# Patient Record
Sex: FEMALE | Race: WHITE | Employment: FULL TIME | URBAN - METROPOLITAN AREA
[De-identification: names, ages, dates, MRNs, and addresses within clinical notes are randomized per-mention and may not be internally consistent; named-entity substitution may affect disease eponyms.]

---

## 2017-01-01 ENCOUNTER — HOSPITAL ENCOUNTER (EMERGENCY)
Facility: CLINIC | Age: 22
Discharge: PSYCHIATRIC OR SUBSTANCE USE FACILITY | End: 2017-01-01
Attending: EMERGENCY MEDICINE | Admitting: EMERGENCY MEDICINE
Payer: COMMERCIAL

## 2017-01-01 ENCOUNTER — HOSPITAL ENCOUNTER (INPATIENT)
Facility: CLINIC | Age: 22
LOS: 2 days | Discharge: HOME | DRG: 885 | End: 2017-01-03
Attending: PSYCHIATRY & NEUROLOGY | Admitting: PSYCHIATRY & NEUROLOGY
Payer: COMMERCIAL

## 2017-01-01 VITALS
OXYGEN SATURATION: 96 % | DIASTOLIC BLOOD PRESSURE: 60 MMHG | SYSTOLIC BLOOD PRESSURE: 112 MMHG | TEMPERATURE: 98.8 F | RESPIRATION RATE: 20 BRPM | HEART RATE: 76 BPM

## 2017-01-01 DIAGNOSIS — F33.9 RECURRENT MAJOR DEPRESSIVE DISORDER, REMISSION STATUS UNSPECIFIED (H): ICD-10-CM

## 2017-01-01 DIAGNOSIS — F32.1 MODERATE SINGLE CURRENT EPISODE OF MAJOR DEPRESSIVE DISORDER (H): Primary | ICD-10-CM

## 2017-01-01 DIAGNOSIS — F33.2 MAJOR DEPRESSIVE DISORDER, RECURRENT SEVERE WITHOUT PSYCHOTIC FEATURES (H): ICD-10-CM

## 2017-01-01 DIAGNOSIS — R45.851 SUICIDAL IDEATION: ICD-10-CM

## 2017-01-01 LAB
ANION GAP SERPL CALCULATED.3IONS-SCNC: 7 MMOL/L (ref 3–14)
BASOPHILS # BLD AUTO: 0.1 10E9/L (ref 0–0.2)
BASOPHILS NFR BLD AUTO: 0.7 %
BUN SERPL-MCNC: 13 MG/DL (ref 7–30)
CALCIUM SERPL-MCNC: 9 MG/DL (ref 8.5–10.1)
CHLORIDE SERPL-SCNC: 109 MMOL/L (ref 94–109)
CO2 SERPL-SCNC: 25 MMOL/L (ref 20–32)
CREAT SERPL-MCNC: 0.75 MG/DL (ref 0.52–1.04)
DIFFERENTIAL METHOD BLD: NORMAL
EOSINOPHIL # BLD AUTO: 0.3 10E9/L (ref 0–0.7)
EOSINOPHIL NFR BLD AUTO: 2.9 %
ERYTHROCYTE [DISTWIDTH] IN BLOOD BY AUTOMATED COUNT: 12.2 % (ref 10–15)
ETHANOL SERPL-MCNC: <0.01 G/DL
GFR SERPL CREATININE-BSD FRML MDRD: NORMAL ML/MIN/1.7M2
GLUCOSE SERPL-MCNC: 92 MG/DL (ref 70–99)
HCT VFR BLD AUTO: 39.2 % (ref 35–47)
HGB BLD-MCNC: 12.9 G/DL (ref 11.7–15.7)
IMM GRANULOCYTES # BLD: 0 10E9/L (ref 0–0.4)
IMM GRANULOCYTES NFR BLD: 0.3 %
LYMPHOCYTES # BLD AUTO: 2.8 10E9/L (ref 0.8–5.3)
LYMPHOCYTES NFR BLD AUTO: 31.6 %
MCH RBC QN AUTO: 33 PG (ref 26.5–33)
MCHC RBC AUTO-ENTMCNC: 32.9 G/DL (ref 31.5–36.5)
MCV RBC AUTO: 100 FL (ref 78–100)
MONOCYTES # BLD AUTO: 0.5 10E9/L (ref 0–1.3)
MONOCYTES NFR BLD AUTO: 5.8 %
NEUTROPHILS # BLD AUTO: 5.3 10E9/L (ref 1.6–8.3)
NEUTROPHILS NFR BLD AUTO: 58.7 %
NRBC # BLD AUTO: 0 10*3/UL
NRBC BLD AUTO-RTO: 0 /100
PLATELET # BLD AUTO: 259 10E9/L (ref 150–450)
POTASSIUM SERPL-SCNC: 3.5 MMOL/L (ref 3.4–5.3)
RBC # BLD AUTO: 3.91 10E12/L (ref 3.8–5.2)
SODIUM SERPL-SCNC: 141 MMOL/L (ref 133–144)
WBC # BLD AUTO: 9 10E9/L (ref 4–11)

## 2017-01-01 PROCEDURE — 99222 1ST HOSP IP/OBS MODERATE 55: CPT | Mod: AI | Performed by: PSYCHIATRY & NEUROLOGY

## 2017-01-01 PROCEDURE — 12400001 ZZH R&B MH UMMC

## 2017-01-01 PROCEDURE — 99285 EMERGENCY DEPT VISIT HI MDM: CPT | Mod: 25

## 2017-01-01 PROCEDURE — 85025 COMPLETE CBC W/AUTO DIFF WBC: CPT | Performed by: EMERGENCY MEDICINE

## 2017-01-01 PROCEDURE — 80320 DRUG SCREEN QUANTALCOHOLS: CPT | Performed by: EMERGENCY MEDICINE

## 2017-01-01 PROCEDURE — 99285 EMERGENCY DEPT VISIT HI MDM: CPT

## 2017-01-01 PROCEDURE — 36415 COLL VENOUS BLD VENIPUNCTURE: CPT | Performed by: EMERGENCY MEDICINE

## 2017-01-01 PROCEDURE — 90791 PSYCH DIAGNOSTIC EVALUATION: CPT

## 2017-01-01 PROCEDURE — 80048 BASIC METABOLIC PNL TOTAL CA: CPT | Performed by: EMERGENCY MEDICINE

## 2017-01-01 RX ORDER — ACETAMINOPHEN 325 MG/1
650 TABLET ORAL EVERY 4 HOURS PRN
Status: DISCONTINUED | OUTPATIENT
Start: 2017-01-01 | End: 2017-01-03 | Stop reason: HOSPADM

## 2017-01-01 RX ORDER — ALUMINA, MAGNESIA, AND SIMETHICONE 2400; 2400; 240 MG/30ML; MG/30ML; MG/30ML
30 SUSPENSION ORAL EVERY 4 HOURS PRN
Status: DISCONTINUED | OUTPATIENT
Start: 2017-01-01 | End: 2017-01-03 | Stop reason: HOSPADM

## 2017-01-01 RX ORDER — TRAZODONE HYDROCHLORIDE 50 MG/1
50 TABLET, FILM COATED ORAL
Status: DISCONTINUED | OUTPATIENT
Start: 2017-01-01 | End: 2017-01-03 | Stop reason: HOSPADM

## 2017-01-01 RX ORDER — HYDROXYZINE HYDROCHLORIDE 25 MG/1
25-50 TABLET, FILM COATED ORAL EVERY 4 HOURS PRN
Status: DISCONTINUED | OUTPATIENT
Start: 2017-01-01 | End: 2017-01-03 | Stop reason: HOSPADM

## 2017-01-01 RX ORDER — BISACODYL 10 MG
10 SUPPOSITORY, RECTAL RECTAL DAILY PRN
Status: DISCONTINUED | OUTPATIENT
Start: 2017-01-01 | End: 2017-01-03 | Stop reason: HOSPADM

## 2017-01-01 ASSESSMENT — ACTIVITIES OF DAILY LIVING (ADL)
DRESS: SCRUBS (BEHAVIORAL HEALTH)
ORAL_HYGIENE: INDEPENDENT
GROOMING: HANDWASHING;INDEPENDENT
ORAL_HYGIENE: PROMPTS
DRESS: STREET CLOTHES;INDEPENDENT
GROOMING: PROMPTS

## 2017-01-01 ASSESSMENT — ENCOUNTER SYMPTOMS: DYSPHORIC MOOD: 1
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
## 2017-01-01 NOTE — PLAN OF CARE
Problem: General Plan of Care (Inpatient Behavioral)  Goal: Individualization/Patient Specific Goal (IP Behavioral)  The patient and/or their representative will achieve their patient-specific goals related to the plan of care.    The patient-specific goals include:   Illness Management Recovery model: Objectives    Patient will identify reason(s) for hospitalization from their perspective.  Patient will identify a minimum of three goals for discharge.  Patient will identify a minimum of three triggers that may increase their symptoms.  Patient will identify a minimum of three coping skills they can do to stay well.  Patient will identify their support system to demonstrate readiness for discharge.

## 2017-01-01 NOTE — ED NOTES
"Patient arrives via EMS on an MADELIN by a [facility] after she made suicidal threats to her parents when they would not let her take the car this evening to go to a friend's house. She was recently evaluated in this ED for a suicide attempt (cutting and ingestion) but was sent home. She is alert and oriented, cooperative at this time, states \"I don't know\" when asked if she is having thoughts of harming herself. Changed into scrubs and belongings secured upon arrival.  "

## 2017-01-01 NOTE — ED PROVIDER NOTES
Received change over from Dr. Veras for this patient.  Patient remained suicidal while in the ED and cannot contract for safety.  She was evaluated by DEC and we are in agreement that patient is at high risk for suicide at the time of evaluation. Psych requested 72 hour hold which was initiated and was accepted at [facility].  Patient has remained calm and cooperative under my care.    Harsh Estrada MD  01/01/17 1029

## 2017-01-01 NOTE — ED PROVIDER NOTES
History     Chief Complaint:  Suicidal      The history is provided by the patient.      Minoo William is a 21 year old female who presents with suicidal ideation. The patient states that she has had increasing depression for the last few months. Three days ago, she cut her wrists for the first time. She was seen here in the ED earlier in the day on 12/31/2015 for suicidal ideation and was set up to see a therapist on Monday. Tonight, her depression worsened when her parents would not let her take the car to go to a friend's house. She states that she thus had suicidal ideation, but denies any specific plan. The patient has not been drinking any alcohol this evening.         Allergies:  No known drug allergies.       Medications:    The patient is currently on no regular medications.       Past Medical History:    History reviewed.  No significant past medical history.       Past Surgical History:    History reviewed. No pertinent past surgical history.      Family History:    History reviewed. No pertinent family history.      Social History:  Marital Status: Single  Presents to the ED via EMS alone  Tobacco Use: Current Every Day Smoker  Alcohol Use: No  PCP: [facility]      Review of Systems   Psychiatric/Behavioral: Positive for suicidal ideas and dysphoric mood.   All other systems reviewed and are negative.      Physical Exam   First Vitals:  BP: 117/71 mmHg  Pulse: 76  Temp: 98.8  F (37.1  C)  Resp: 20  SpO2: 100 %    Physical Exam  Constitutional: Appears well-developed and well-nourished. Patient is tearful.  HENT:    Head: No external signs of trauma noted.    Eyes: Conjunctivae are normal. Pupils are equal, round, and reactive to light.    Cardiovascular: Normal rate, regular rhythm and normal heart sounds.    Pulmonary/Chest: Effort normal and breath sounds normal. No respiratory distress. Patient has no wheezes.   Neurological: Patient is alert and oriented to person, place, and time.    Skin: Skin is warm and dry. No diaphoresis noted. Superficial lacerations noted to the left wrist. These have scabs over them and the patient stated that those were from a couple days ago.  Psychiatric: Patient has a depressed mood and flat affect. Somewhat poor eye contact. Evasive with some answers to questions.  Emergency Department Course   Laboratory:  Alcohol ethyl; <0.01  BMP:  WNL (Creatinine 0.75)   CBC:  WBC 9.0, HGB 12.9, , otherwise WNL     Emergency Department Course:  Nursing notes and vitals reviewed.  I performed an exam of the patient as documented above.   A peripheral IV was established. Blood was drawn from the patient. This was sent for laboratory testing, findings above.    Patient's care will be signed out to Dr. Estrada pending DEC assessment and final disposition.     Impression & Plan      Medical Decision Making:  Minoo William is a 21 year old female who presented to the ER for suicidal ideations. She was actually seen yesterday and evaluated and discharged. She states that there was some disagreement with her parents over the use of a car and she began to feel more suicidal. She has been cooperative in the ER, but definitely has a depressed mood and flat affect. She does not really have that great of eye contact and is not terribly forthcoming with answers to questions. I have placed her on an MADELIN and I will await DEC assessment. The patient will be signed out to the oncoming ED provider for final disposition after DEC assessment.     Diagnosis:    ICD-10-CM    1. Recurrent major depressive disorder, remission status unspecified (H) F33.9        I, Tereza Cloud, am serving as a scribe on 1/1/2017 at 2:19 AM to personally document services performed by Dr. Veras based on my observations and the provider's statements to me.    1/1/2017   [facility] EMERGENCY DEPARTMENT        Felix Veras DO  01/01/17 0739

## 2017-01-01 NOTE — IP AVS SNAPSHOT
[facility]    [facility] [address]    [address] [address]    Phone:  [phone]                                       After Visit Summary   1/1/2017    Minoo William    MRN: [id number]           After Visit Summary Signature Page     I have received my discharge instructions, and my questions have been answered. I have discussed any challenges I see with this plan with the nurse or doctor.    ..........................................................................................................................................  Patient/Patient Representative Signature      ..........................................................................................................................................  Patient Representative Print Name and Relationship to Patient    ..................................................               ................................................  Date                                            Time    ..........................................................................................................................................  Reviewed by Signature/Title    ...................................................              ..............................................  Date                                                            Time

## 2017-01-01 NOTE — PLAN OF CARE
Problem: General Plan of Care (Inpatient Behavioral)  Goal: Individualization/Patient Specific Goal (IP Behavioral)  The patient and/or their representative will achieve their patient-specific goals related to the plan of care.    The patient-specific goals include:   Illness Management Recovery model:  Self-Reflection & Planning.    Assessed patient's progress completing forms related to Illness Management Recovery (including Personal Plan of Care, Adult Coping Plan, and My Support and Coping Plan) and assisted as needed.    Encouraged patient to continue to consider triggers, wellness strategies, early warning signs, feedback from others, actions to take to prevent relapse, and coping strategies as part of a plan to remain well after leaving the hospital.    1. Talking to a friend  2. Doing my make up  3. Work

## 2017-01-01 NOTE — PROGRESS NOTES
01/01/17 1218   Patient Belongings   Did you bring any home meds/supplements to the hospital?  No   Patient Belongings cell phone/electronics;clothing;keys   Disposition of Belongings shoes, bra, yoga pants, pink shirt, keys, samsung cell phone, turtleneck sweater in pt bin in locked utility room   Belongings Search Yes   Clothing Search Yes   Second Staff Eboni PACHECO     In Pt Bin:    Shoes  Bra  Yoga pants  Shirt  Keys  Cell phone samsung  Turtleneck sweater    ADMISSION:  I am responsible for any personal items that are not sent to the safe or pharmacy. [facility] is not responsible for loss, theft or damage of any property in my possession.    Patient Signature _____________________ Date/Time _____________________    Staff Signature _______________________ Date/Time _____________________    2nd Staff person, if patient is unable/unwilling to sign  ___________________________________ Date/Time _____________________    DISCHARGE:  My personal items have been returned to me.   Patient Signature _____________________ Date/Time _____________________

## 2017-01-01 NOTE — IP AVS SNAPSHOT
MRN:[id number]                      After Visit Summary   1/1/2017    Minoo William    MRN: [id number]           Thank you!     Thank you for choosing [facility] for your care. Our goal is always to provide you with excellent care. Hearing back from our patients is one way we can continue to improve our services. Please take a few minutes to complete the written survey that you may receive in the mail after you visit with us. Thank you!        Patient Information     Date Of Birth          1995        About your hospital stay     You were admitted on:  January 1, 2017 You last received care in the:  [facility]    You were discharged on:  January 3, 2017       Who to Call     For medical emergencies, please call 911.  For non-urgent questions about your medical care, please call your primary care provider or clinic, [phone]          Attending Provider     Provider    Joey Terrazas MD       Primary Care Provider Office Phone # Fax #    [facility] [phone]       [address] [place], [place] [address]        Further instructions from your care team       Behavioral Discharge Planning and Instructions    Summary: You were admitted to the hospital on 1/1/17 and treated by Dr. Joey Terrazas. You are being discharged to home on 01/03/2017     Main Diagnosis: Major depressive disorder, single episode, moderate    Major Treatments, Procedures and Findings: Psychiatric evaluation; unit programming; aftercare planning.    Symptoms to Report: feeling more aggressive, increased confusion, losing more sleep, mood getting worse or thoughts of suicide    Lifestyle Adjustment: Take medications as prescribed. Go to follow up appointments as scheduled. Get adequate rest and try to eat healthy foods.    Psychiatry Follow-up:   [facility]  Tuesday January 10 @ 9:40am with Audrey Schrader  [address] [place], [place] [address] [phone]  [phone]  [facility] has faxed the discharge summary and AVS to this provider at [phone]     Therapist Yolanda Dalal PhD  Thursday January 5 @ 1:00pm  [address]   [place] [address]  [phone]  [facility] has faxed the discharge summary and AVS to this provider at [phone]     Mental health crisis response for your county is offered 24 hours a day, 7 days a week. A trained counselor will assess your current situation, offer support and counseling and connect you with local resources. Please call  [facility] Crisis Team:  [phone]    Resources:   Crisis Intervention: 343.436.7211 or 366-691-5356 (TTY: 936.785.6404).  Call anytime for help.  National [place] on Mental Illness (www.[place].bertha.org): 942.894.3610 or 860-698-8619.  Suicide Awareness Voices of Education (SAVE) (www.save.org): 541-579-VDPH (4287)  National Suicide Prevention Line (www.mentalhealthmn.org): 283-199-LAJL (2096)  Mental Health Consumer/Survivor Network of [place] (www.mhcsn.net): 537.175.4194 or 166-162-4909  Mental Health Association of [place] (www.mentalhealth.org): 591.500.6668 or 925-289-7270    General Medication Instructions:   See your medication sheet(s) for instructions.   Take all medicines as directed.  Make no changes unless your doctor suggests them.   Go to all your doctor visits.  Be sure to have all your required lab tests. This way, your medicines can be refilled on time.  Do not use any drugs not prescribed by your doctor.  Avoid alcohol.      The treatment team has appreciated the opportunity to work with you.  We wish you the best in the future.  You will be receiving a follow-up phone call within the next three days from a representative from behavioral health.  You have identified the best phone number to reach you as [phone].   If you have any questions or concerns our unit number is [phone].       Pending Results     No orders found from 12/31/2016 to 1/2/2017.           "  Statement of Approval     Ordered          17 1241  I have reviewed and agree with all the recommendations and orders detailed in this document.   EFFECTIVE NOW     Approved and electronically signed by:  Joey Terrazas MD             Admission Information        Provider Department Dept Phone    2017 Joey Terrazas MD [facility]  [phone]      Your Vitals Were     Blood Pressure Pulse Temperature    129/61 mmHg 77 98.1  F (36.7  C) (Oral)    Respirations Height Weight    16 1.702 m (5' 7\") 97.796 kg (215 lb 9.6 oz)    BMI (Body Mass Index) Pulse Oximetry       33.76 kg/m2 97%       MyChart Information     [facility] lets you send messages to your doctor, view your test results, renew your prescriptions, schedule appointments and more. To sign up, go to [email or web address] . Click on \"Log in\" on the left side of the screen, which will take you to the Welcome page. Then click on \"Sign up Now\" on the right side of the page.     You will be asked to enter the access code listed below, as well as some personal information. Please follow the directions to create your username and password.     Your access code is: [email or web address]  Expires: 3/31/2017  4:13 PM     Your access code will  in 90 days. If you need help or a new code, please call your [facility] clinic or [phone].        Care EveryWhere ID     This is your Care EveryWhere ID. This could be used by other organizations to access your [facility] medical records  [id number]           Review of your medicines      START taking        Dose / Directions    sertraline 50 MG tablet   Commonly known as:  ZOLOFT   Used for:  Major depressive disorder, recurrent severe without psychotic features (H)        Take one-half tab daily for six days then increase to 1 tab daily.   Quantity:  30 tablet   Refills:  1            Where to get your medicines      These medications were sent to [facility] [place], [place] - " [address]  [address] [facility] [address]     Phone:  [phone]    - sertraline 50 MG tablet             Protect others around you: Learn how to safely use, store and throw away your medicines at www.disposemymeds.org.             Medication List: This is a list of all your medications and when to take them. Check marks below indicate your daily home schedule. Keep this list as a reference.      Medications           Morning Afternoon Evening Bedtime As Needed    sertraline 50 MG tablet   Commonly known as:  ZOLOFT   Take one-half tab daily for six days then increase to 1 tab daily.   Last time this was given:  25 mg on 1/3/2017  9:00 AM

## 2017-01-02 LAB — TSH SERPL DL<=0.005 MIU/L-ACNC: 1.95 MU/L (ref 0.4–4)

## 2017-01-02 PROCEDURE — 84443 ASSAY THYROID STIM HORMONE: CPT | Performed by: PSYCHIATRY & NEUROLOGY

## 2017-01-02 PROCEDURE — 36415 COLL VENOUS BLD VENIPUNCTURE: CPT | Performed by: PSYCHIATRY & NEUROLOGY

## 2017-01-02 PROCEDURE — 12400001 ZZH R&B MH UMMC

## 2017-01-02 PROCEDURE — 25000132 ZZH RX MED GY IP 250 OP 250 PS 637: Performed by: PSYCHIATRY & NEUROLOGY

## 2017-01-02 PROCEDURE — 99232 SBSQ HOSP IP/OBS MODERATE 35: CPT | Performed by: PSYCHIATRY & NEUROLOGY

## 2017-01-02 PROCEDURE — H2032 ACTIVITY THERAPY, PER 15 MIN: HCPCS

## 2017-01-02 RX ORDER — SERTRALINE HYDROCHLORIDE 25 MG/1
25 TABLET, FILM COATED ORAL DAILY
Status: DISCONTINUED | OUTPATIENT
Start: 2017-01-02 | End: 2017-01-03 | Stop reason: HOSPADM

## 2017-01-02 RX ADMIN — SERTRALINE HYDROCHLORIDE 25 MG: 25 TABLET ORAL at 12:47

## 2017-01-02 ASSESSMENT — ACTIVITIES OF DAILY LIVING (ADL)
DRESS: STREET CLOTHES
LAUNDRY: WITH SUPERVISION
DRESS: SCRUBS (BEHAVIORAL HEALTH)
ORAL_HYGIENE: INDEPENDENT
HYGIENE/GROOMING: HANDWASHING;INDEPENDENT
ORAL_HYGIENE: INDEPENDENT
HYGIENE/GROOMING: INDEPENDENT

## 2017-01-02 NOTE — PLAN OF CARE
Problem: General Plan of Care (Inpatient Behavioral)  Goal: Team Discussion  Team Plan:   BEHAVIORAL TEAM DISCUSSION    Continued Stay Criteria/Rationale: Patient is newly admitted with depression and suicidal ideation. Evaluation in process.  Plan: Provide a safe environment and therapeutic milieu. Encourage participation in unit programming. Develop appropriate aftercare plan.  Participants: Randee QUINONES; Joey Terrazas MD;   Summary/Recommendation: Plan for medication initiation and referral for therapy. Possible discharge Tuesday January 3rd.  Medical/Physical: stable  Progress: improving.     Illness Management Recovery model: Personal Plan of Care    Patient completed Personal Plan of Care, identifying reasons for hospitalization and goals for discharge. Form reviewed in team meeting and signed by patient, physician, writer and RN. Form given to [facility] to be scanned into [employer].

## 2017-01-02 NOTE — PLAN OF CARE
Problem: General Plan of Care (Inpatient Behavioral)  Goal: Individualization/Patient Specific Goal (IP Behavioral)  The patient and/or their representative will achieve their patient-specific goals related to the plan of care.    The patient-specific goals include:     Illness Management Recovery model: Objectives  Patient will identify reason(s) for hospitalization from their perspective.  Patient will identify a minimum of three goals for discharge.  Patient will identify a minimum of three triggers that may increase their symptoms.  Patient will identify a minimum of three coping skills they can do to stay well.   Patient will identify their support system to demonstrate readiness for discharge.    Illness Management & Recovery assists patient to develop relapse prevention as  patient identifies triggers for relapse.  patient identifies a general wellness strategy.  patient identifies the warning signs that they are in danger of relapse.  patient identifies someone they count on to get feedback .  patient identifies ways to take action when in danger of relapse.  patient identifies way to cope with stress or other symptoms.   patient participates in self-reflection.      Patient has completed Personal Plan of Care as follows:  Reasons for admission  1. To get help with depression    Goals for discharge  1. Improved mood  2. Scheduled outpatient appointments.

## 2017-01-02 NOTE — PROGRESS NOTES
"[facility], [place]   Psychiatric Progress Note        Interim History:   The patient's care was discussed with the treatment team during the daily team meeting and/or staff's chart notes were reviewed.  Staff report patient has been improving. She has been attending groups. Continues to appear depressed.    The patient reports that she is feeling more comfortable here today. She admits to long standing depression and is agreeable to starting sertraline. We discussed risks and benefits of this including black box warning about increase in suicidal thinking. Patient is hopeful to leave soon.    [facility] contacted patient's parents who want patient to be getting active treatment (medications) with therapy scheduled prior to return home.         Medications:       sertraline  25 mg Oral Daily          Allergies:   No Known Allergies       Labs:   No results found for this or any previous visit (from the past 24 hour(s)).       Psychiatric Examination:   /64 mmHg  Pulse 76  Temp(Src) 98.1  F (36.7  C) (Oral)  Resp 17  Ht 1.702 m (5' 7\")  Wt 99.156 kg (218 lb 9.6 oz)  BMI 34.23 kg/m2  SpO2 97%  Weight is 218 lbs 9.6 oz  Body mass index is 34.23 kg/(m^2).    Appearance: awake, alert, adequately groomed and dressed in hospital scrubs  Attitude:  cooperative  Eye Contact:  good  Mood:  depressed  Affect:  intensity is blunted  Speech:  clear, coherent and paucity of speech   Language: Intact  Psychomotor Behavior and Gait:  no evidence of tardive dyskinesia, dystonia, or tics and intact station, gait and muscle tone  Throught Process:  logical, linear and goal oriented  Associations:  no loose associations  Thought Content:  no evidence of suicidal ideation or homicidal ideation and no evidence of psychotic thought  Insight:  fair  Judgement:  intact  Oriented to:  time, person, and place  Attention Span and Concentration:  intact  Recent and Remote Memory:  intact  Fund of Knowledge: " Adequate         Precautions:     Behavioral Orders   Procedures     Code 1 - Restrict to Unit     Routine Programming     As clinically indicated     Self Injury Precaution     Status 15     Every 15 minutes.     Suicide precautions          Diagnoses:   1. Major depressive disorder, recurrent, severe without pscyhosis         Plan:   1. Start sertraline 25 mg daily    Legal: on 72 hour hold. Will observe overnight and determine action tomorrow.    Disposition: The patient has shown some improvement but remains depressed and as of yesterday continued to have suicidal thinking. Likely discharge tomorrow.

## 2017-01-02 NOTE — PROGRESS NOTES
Initial Psychosocial Assessment    I have reviewed the chart, met with the patient, and developed Care Plan.  Information for assessment was obtained from patient, parents and chart notes.     Presenting Problem:  Patient was admitted on a 72 hour hold with suicidal ideation. She has been struggling with work stressors and some conflict with parents. Parents indicate that patient has been reluctant to seek help and has been expressing suicidal ideation more and more frequently lately.     History of Mental Health and Chemical Dependency:  This is patient's first mental health admission.  She has a history of MDD and was seen in the ED 2  prior to admission. At that time patient was set up with a therapy appointment and sent home, bus symptoms escalated and she came back to the ED and was admitted.      Family Description (Constellation, Family Psychiatric History):  Patient grew up with parents and older brother. No family history of mental illness. Patient is single, no children.     Significant Life Events (Illness, Abuse, Trauma, Death):  None per patient though she does indicate conflict with father throughout her childhood and current.    Living Situation:  Patient is currently living with her parents and older brother.    Educational Background:  Patient completed high school and one year of college.    Occupational History:  Patient works full time at [employer] in customer service.    Financial Status:  Patient's parents help support as she is living with them. She pays for her car expenses and other personal expenses and indicates she is struggling to keep up with those expenses.    Legal Issues:  None     Ethnic/Cultural Issues:  No issues noted.     Spiritual Orientation:  Patient endorses a belief in God. She is not currently attending [employer] but has in the past.     Service History:  None    Social Functioning (organization, interests):  Patient enjoys being with friends.     Current Treatment  Providers are:  Patient was scheduled to see a new therapist today, January 2nd at 1pm. This appointment has been cancelled and request to reschedule has been placed.  Yolanda Dalal PhD  [address]  [place], [place] [address] [phone]    PCP is Audrey HOOVER at [facility], [address] [place], [place] [address] [phone]    Social Service Assessment/Plan:  Patient was offered medication and initially declined. She has since learned that her parents feel strongly that she should start a medication and also engage in therapy. They have indicated in order for patient to return to their home, she will need to follow all recommendations of the treatment team. She plans to discuss medications further with the attending psychiatrist today. She is open to following up with therapy. CTC to assist as needed with arranging appropriate follow up appointments. Possible discharge on Tuesday.

## 2017-01-02 NOTE — PROGRESS NOTES
01/02/17 1300   Behavioral Health   Hallucinations denies / not responding to hallucinations   Thinking intact   Orientation person: oriented;place: oriented;date: oriented;time: oriented   Memory baseline memory   Insight poor;other (see comment)  (Claimed insight)   Judgement impaired   Eye Contact at examiner   Affect blunted, flat   Mood mood is calm   Physical Appearance/Attire appears stated age;attire appropriate to age and situation   Hygiene well groomed   Suicidality other (see comments)  (Denies)   Self Injury other (see comment)  (Denies)   Activity withdrawn   Speech clear;coherent   Medication Sensitivity (WDL) WDL   Psychomotor Gait (WDL) WDL   Overt Agression (WDL) WDL   Psycho Education   Type of Intervention 1:1 intervention   Response participates, initiates socially appropriate   Hours 0.5   Treatment Detail Triggers   Activities of Daily Living   Hygiene/Grooming independent   Oral Hygiene independent   Dress scrubs (behavioral health)   Room Organization independent   patient appears willing to say what I need to hear for her to get discharged. Denied any issues, but seemed to understand she couldn't be having any issues if she wanted to go home.

## 2017-01-02 NOTE — PROGRESS NOTES
"   01/02/17 1600   Art Therapy   Type of Intervention structured groups   Response participates with encouragement   Hours 2.5   Participated in Art Therapy directive, inside outside portraits.  Third hour we did sun salutations in a chair and learned some relaxing breaths.  Pt worked hard on her portrait.  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 Her portrait reflected the same. She joined chair yoga, but didn't do the drawing [place] with the other women peers.  "

## 2017-01-03 VITALS
TEMPERATURE: 98.1 F | HEART RATE: 77 BPM | WEIGHT: 215.6 LBS | BODY MASS INDEX: 33.84 KG/M2 | OXYGEN SATURATION: 97 % | SYSTOLIC BLOOD PRESSURE: 129 MMHG | HEIGHT: 67 IN | RESPIRATION RATE: 16 BRPM | DIASTOLIC BLOOD PRESSURE: 61 MMHG

## 2017-01-03 PROCEDURE — 97150 GROUP THERAPEUTIC PROCEDURES: CPT | Mod: GO

## 2017-01-03 PROCEDURE — 25000132 ZZH RX MED GY IP 250 OP 250 PS 637: Performed by: PSYCHIATRY & NEUROLOGY

## 2017-01-03 PROCEDURE — 99239 HOSP IP/OBS DSCHRG MGMT >30: CPT | Performed by: PSYCHIATRY & NEUROLOGY

## 2017-01-03 PROCEDURE — 90853 GROUP PSYCHOTHERAPY: CPT

## 2017-01-03 RX ADMIN — SERTRALINE HYDROCHLORIDE 25 MG: 25 TABLET ORAL at 09:00

## 2017-01-03 ASSESSMENT — ACTIVITIES OF DAILY LIVING (ADL)
ORAL_HYGIENE: INDEPENDENT
LAUNDRY: WITH SUPERVISION
DRESS: INDEPENDENT
GROOMING: INDEPENDENT

## 2017-01-03 NOTE — PROGRESS NOTES
Patient discharging 1/3/2017 accompanied by mother and destination is home. Patient waiting for medications only and w/ leave with mother.    Discharge paperwork and medications reviewed with patient who verbalizes understanding.     Copies provided: AVS X      Med Rec X Meds (waiting)  Security X   Locker X     DISCHARGE FLOW SHEET: X    CARE PLAN COMPLETE: X    EDUCATION COMPLETE: X    Illness Management Recovery model: Personal Plan of Care    Patient completed Personal Plan of Care, identifying reasons for hospitalization and goals for discharge. Form reviewed in team meeting  by patient, physician, writer and RN. Form given to [facility] to be scanned into EPIC.    Survey provided.

## 2017-01-03 NOTE — PLAN OF CARE
Problem: Depressive Symptoms  Goal: Depressive Symptoms  Signs and symptoms of listed problems will be absent or manageable.     Patient, prior to discharge, will:  -verbalize decrease in depressive signs/symptoms  -verbalize a decrease in anxiety   -verbalize an understanding of medication regimen   -verbalize absence of SI/SIB   -develop a safety plan  -identify a support system   -will participate in coordination of discharge planning    To promote safety/ mental health    Patient identified the following   Triggers:    Wellness Strategies:    Warning Signs:      Feedback (people they would like to receive feedback from if early warning signs):  Friend(s)    Family(s):     Partner/Spouse:     Support Group Member(s):     Co-Worker(s):     Taking Action:    Ways to [place]:      Self-Reflection & Planning.  Assessed patient s progress completing forms related to Illness Management Recovery (including Personal Plan of Care, Adult Coping Plan, and My Support and Coping Plan) and assisted as needed.    Encouraged patient to continue to consider triggers, wellness strategies, early warning signs, feedback from others, actions to take to prevent relapse, and coping strategies as part of a plan to remain well after leaving the hospital.            Outcome: Therapy, progress towards functional goals is fair  48 hour nursing assessment.  Pt evaluation continues.  Assessed mood, anxiety, thoughts and behavior.  Is progressing towards goals.  Encourage participation in groups and developing health coping skills.  Will continue to assess.  Pt denies auditory or visual hallucinations.  Refer to daily team meeting notes for individualized plan of care.    Pt had a good shift.  She was active in the milieu all shift.  She denies SI, SIB this shift.  She seemed to have a positive outlook towards discharge.  Denied any side effects from medications.  She denies depression but rates her anxiety at 5/10.

## 2017-01-03 NOTE — PROGRESS NOTES
"   01/03/17 1600   Occupational Therapy   Type of Intervention structured groups   Response Participates   Hours 3   Pt. Attended 3 of 3 scheduled OT sessions today.  Observations: pt had positive affect and contribution to group discussion and was support of peers, engaging in tasks to completion.  Group Description:   Pt attended and participated in a structured occupational therapy group session. Pt were educated on therapeutic benefit of Origami as a low-cost coping mechanism. Pt were provided a variety of instructions and 1:1 assistance during group to complete instructions. Pt's identified experiences and benefits of activity for mental health.   Pt participated in OT clinic, working on completing a self-initiated project facilitated by OT and graded to appropriate functional performance.   Pt participated in PM topic group, focused on coping mechanisms and dealing with anxiety. Pt engaged in a therapeutic conversation about positive coping skills and supports in the context of \"Battleship\" with a paper/pencil print out game involving groups in pairs. Pt identified ways to effectively manage thoughts, emotions, and actions and felt comfortable sharing with staff and peers.    "

## 2017-01-03 NOTE — DISCHARGE SUMMARY
Psychiatric Discharge Summary    Minoo William MRN# [id number]   Age: 21 year old YOB: 1995     Date of Admission:  1/1/2017  Date of Discharge:  1/3/2017  Admitting Physician:  Joey Terrazas MD  Discharge Physician:  Joey Terrazas MD         Event Leading to Hospitalization:   The patient is a 21-year-old woman who was just admitted.  She is very tearful, anxious about being in the hospital and wants to go home. She reports that she has been having some suicidal thoughts for a while now.  She denies most other symptoms of depression.  She says that her mood is not depressed most days.  She does not have any problems with appetite or sleep.  She does not have any problems with daytime energy.  She works at [employer].  She says she goes out with friends, has a good time doing it.  Says she does not have anhedonia.  She denies symptoms of areli or symptoms of psychosis.  She indicates that she feels not wanted at home when she tried to reach out to her family.  She states that they shut her down.  They tell her that they do not want to talk to her.  She feels yelled at by them.  She says that she is a  at [employer].  She says they are trying to get rid of her there because she is a highest paid person on that job.  She is able to realize that it is probably not personal; however, she is upset about this, understandably so.  She is not interested in medication.  She says she did have medication in the past; however, she does not remember what the medication was and does not recall it being helpful.  She apparently set up a therapy appointment for Monday and really feels like that might be helpful and would like to go.        The events that preceded this hospitalization were more acute.  She apparently had an overdose on Thursday and did some superficial cutting on herself.  This led to presentation in the emergency room and she was sent home.  She apparently had  further fights with her parents.  She said she wanted to get out of the house and get some air.  Her parents gave her an ultimatum and said that if she took her car to go see her friends that she would be homeless.  She did it anyway, she went and saw her friend and when she came back the parents had locked the door.  She said that when she was trying to get in they called the police on her.  She made suicidal statements and was brought to the hospital.  The patient is upset that her parents locked her out.  She says this has never happened before.  She feels safe at home.  Denies any abuse other than the nonsupportive emotional situations.       See Admission note by Joey Terrazas MD on 1/1/2017 for additional details.          Diagnoses:     1. Major depressive disorder, recurrent, severe without pscyhosis           Labs:     Results for orders placed or performed during the hospital encounter of 01/01/17   TSH with free T4 reflex and/or T3 as indicated   Result Value Ref Range    TSH 1.95 0.40 - 4.00 mU/L              Consults:   No consultations were requested during this admission         Hospital Course:   Minoo William was admitted to Station 4[facility] with attending Joey Terrazas MD on a 72 hour mental health hold. The patient was placed under status 15 (15 minute checks) to ensure patient safety.     The patient initially was quite tearful and denied having depression. She was anxious about being in the hospital. Based on more time for evaluation and information from family, it was learned that she does have more depressive symptoms that are more chronic in nature. She then agreed to treatment with SSRI and started taking sertraline. She was advised of potential increase in suicidal thinking as well as other side effects. On 1/3 after a few days in the hospital it was decided that the patient did not meet criteria for a commitment based on observations and talking with family.. She did not wish to  remain in the hospital any longer, so her request for discharge was granted. She will return home with family. She was able to review a reasonable and actionable safety plan with me verbally. She was denying all suicidal thinking at the time of discharge.    Minoo William was released to home. At the time of discharge Minoo William was determined to not be a danger to herself or others.          Discharge Medications:     Current Discharge Medication List      START taking these medications    Details   sertraline (ZOLOFT) 50 MG tablet Take one-half tab daily for six days then increase to 1 tab daily.  Qty: 30 tablet, Refills: 1    Associated Diagnoses: Major depressive disorder, recurrent severe without psychotic features (H)                  Psychiatric Examination:   Appearance:  awake, alert, adequately groomed and dressed in hospital scrubs  Attitude:  cooperative  Eye Contact:  good  Mood:  sad   Affect:  improved range today  Speech:  clear, coherent and normal prosody  Psychomotor Behavior:  no evidence of tardive dyskinesia, dystonia, or tics  Thought Process:  logical, linear and goal oriented  Associations:  no loose associations  Thought Content:  no evidence of suicidal ideation or homicidal ideation and no evidence of psychotic thought  Insight:  fair  Judgment:  fair  Oriented to:  time, person, and place  Attention Span and Concentration:  intact  Recent and Remote Memory:  intact  Language: Able to name objects, Able to repeat phrases and Able to read and write  Fund of Knowledge: appropriate  Muscle Strength and Tone: normal  Gait and Station: Normal         Discharge Plan:   Psychiatric Appointments: Audrey Schrader on 1/10/17  Psychotherapy Appointments: Yolanda Dalal, PhD on 1/5/2017      Attestation:  The patient has been seen and evaluated by me,  Joey Terrazas MD  On the day of discharge, I saw the patient and performed the above examination, reviewed discharge medications, reviewed  follow-up plan, and assessed safety for discharge. I spent greater than 30 minutes on these tasks.

## 2017-01-03 NOTE — PROGRESS NOTES
Writer spoke with patient's mother re d/c plans. Pt's mother did not have any other questions or concerns regarding d/c.  Pt's father will transport her home today and plans to arrive by 330pm.

## 2017-01-03 NOTE — DISCHARGE INSTRUCTIONS
Behavioral Discharge Planning and Instructions    Summary: You were admitted to the hospital on 1/1/17 and treated by Dr. Joey Terrazas. You are being discharged to home on 01/03/2017     Main Diagnosis: Major depressive disorder, single episode, moderate    Major Treatments, Procedures and Findings: Psychiatric evaluation; unit programming; aftercare planning.    Symptoms to Report: feeling more aggressive, increased confusion, losing more sleep, mood getting worse or thoughts of suicide    Lifestyle Adjustment: Take medications as prescribed. Go to follow up appointments as scheduled. Get adequate rest and try to eat healthy foods.    Psychiatry Follow-up:   [facility]  Tuesday January 10 @ 9:40am with [place] [address]  [phone]  [facility] has faxed the discharge summary and AVS to this provider at [phone]     Therapist Yolanda Dalal PhD  Thursday January 5 @ 1:00pm  [address]   [place] [address]  [phone]  [facility] has faxed the discharge summary and AVS to this provider at [phone]     Mental health crisis response for your [facility] is offered 24 hours a day, 7 days a week. A trained counselor will assess your current situation, offer support and counseling and connect you with local resources. Please call  [facility] Crisis Team:  [phone]    Resources:   Crisis Intervention: 578.919.5167 or 721-420-1037 (TTY: 121.938.5344).  Call anytime for help.  National [place] on Mental Illness (www.[place].bertha.org): 829.854.5185 or 029-124-7885.  Suicide Awareness Voices of Education (SAVE) (www.save.org): 110-201-GMVE (9183)  National Suicide Prevention Line (www.mentalhealthmn.org): 796-919-SDMA (8280)  Mental Health Consumer/Survivor Network of [place] (www.mhcsn.net): 410.640.5732 or 948-834-9375  Mental Health Association of [place] (www.mentalhealth.org): 837.971.5858 or 198-787-9856    General Medication  Instructions:   See your medication sheet(s) for instructions.   Take all medicines as directed.  Make no changes unless your doctor suggests them.   Go to all your doctor visits.  Be sure to have all your required lab tests. This way, your medicines can be refilled on time.  Do not use any drugs not prescribed by your doctor.  Avoid alcohol.      The treatment team has appreciated the opportunity to work with you.  We wish you the best in the future.  You will be receiving a follow-up phone call within the next three days from a representative from behavioral health.  You have identified the best phone number to reach you as [phone].   If you have any questions or concerns our unit number is [phone].

## 2019-11-06 LAB
ABO + RH BLD: NORMAL
ABO + RH BLD: NORMAL
BLD GP AB SCN SERPL QL: NEGATIVE
HBV SURFACE AG SERPL QL IA: NORMAL
HEMOGLOBIN: 13.6 G/DL (ref 11.7–15.7)
HIV 1+2 AB+HIV1 P24 AG SERPL QL IA: NORMAL
PLATELET # BLD AUTO: 415 10^9/L
RUBELLA ABY IGG: NORMAL
TREPONEMA ANTIBODIES: NEGATIVE

## 2019-11-12 ENCOUNTER — PRENATAL OFFICE VISIT (OUTPATIENT)
Dept: NURSING | Facility: CLINIC | Age: 24
End: 2019-11-12

## 2019-11-12 VITALS — BODY MASS INDEX: 36.57 KG/M2 | WEIGHT: 233 LBS | HEIGHT: 67 IN

## 2019-11-12 DIAGNOSIS — Z34.01 ENCOUNTER FOR SUPERVISION OF NORMAL FIRST PREGNANCY IN FIRST TRIMESTER: Primary | ICD-10-CM

## 2019-11-12 PROCEDURE — 99207 ZZC NO CHARGE NURSE ONLY: CPT

## 2019-11-12 RX ORDER — PRENATAL VIT/IRON FUM/FOLIC AC 27MG-0.8MG
1 TABLET ORAL DAILY
Qty: 90 TABLET | Refills: 3
Start: 2019-11-12

## 2019-11-12 SDOH — ECONOMIC STABILITY: FOOD INSECURITY: WITHIN THE PAST 12 MONTHS, YOU WORRIED THAT YOUR FOOD WOULD RUN OUT BEFORE YOU GOT MONEY TO BUY MORE.: NEVER TRUE

## 2019-11-12 SDOH — ECONOMIC STABILITY: TRANSPORTATION INSECURITY
IN THE PAST 12 MONTHS, HAS THE LACK OF TRANSPORTATION KEPT YOU FROM MEDICAL APPOINTMENTS OR FROM GETTING MEDICATIONS?: NO

## 2019-11-12 SDOH — ECONOMIC STABILITY: FOOD INSECURITY: WITHIN THE PAST 12 MONTHS, THE FOOD YOU BOUGHT JUST DIDN'T LAST AND YOU DIDN'T HAVE MONEY TO GET MORE.: NEVER TRUE

## 2019-11-12 SDOH — ECONOMIC STABILITY: INCOME INSECURITY: HOW HARD IS IT FOR YOU TO PAY FOR THE VERY BASICS LIKE FOOD, HOUSING, MEDICAL CARE, AND HEATING?: NOT HARD AT ALL

## 2019-11-12 SDOH — ECONOMIC STABILITY: TRANSPORTATION INSECURITY
IN THE PAST 12 MONTHS, HAS LACK OF TRANSPORTATION KEPT YOU FROM MEETINGS, WORK, OR FROM GETTING THINGS NEEDED FOR DAILY LIVING?: NO

## 2019-11-12 ASSESSMENT — MIFFLIN-ST. JEOR: SCORE: 1843.38

## 2019-11-12 NOTE — PROGRESS NOTES
"Chief Complaint   Patient presents with     Prenatal Care     New Prenatal Telephone Visit   8w0d  Estimated Date of Delivery: 2020      Initial Ht 1.7 m (5' 6.93\")   Wt 105.7 kg (233 lb)   LMP 2019   BMI 36.57 kg/m   Estimated body mass index is 36.57 kg/m  as calculated from the following:    Height as of this encounter: 1.7 m (5' 6.93\").    Weight as of this encounter: 105.7 kg (233 lb).  BP completed using cuff size: NA (Not Taken)    Questioned patient about current smoking habits.  Pt. currently smokes.  Advised about smoking cessation.    The following HM Due: pap smear  Chalmydia (13-24)      Prenatal book and folder (containing standard educational hand-outs and brochures) will be given to patient at NPN visit. Information in folder reviewed. Questions answered. Brochure given on optional screening available to assess chromosomal anomalies. Pt advised to call the clinic if she has any questions or concerns related to her pregnancy. Prenatal labs obtained. New prenatal visit scheduled on 19 with Mounika Portillo CNM.        Patient supplied answers from flow sheet for:  Prenatal OB Questionnaire.  Past Medical History  Diabetes?: No  Hypertension : No  Heart disease, mitral valve prolapse or rheumatic fever?: No  An autoimmune disease such as lupus or rheumatoid arthritis?: No  Kidney disease or urinary tract infection?: No  Epilepsy, seizures or spells?: No  Migraine headaches?: (!) Yes  A stroke or loss of function or sensation?: No  Any other neurological problems?: No  Have you ever been treated for depression?: (!) Yes(depression/anxiety since she was a child)  Are you having problems with crying spells or loss of self-esteem?: No  Have you ever required psychiatric care?: (!) Yes  Have you ever had hepatitis, liver disease or jaundice?: No  Have you been treated for blood clots in your veins, deep vein thromosis, inflammation in the veins, thrombosis, phlebitis, pulmonary " embolism or varicosities?: No  Have you had excessive bleeding after surgery or dental work?: No  Do you bleed more than other women after a cut or scratch?: No  Do you have a history of anemia?: No  Have you ever had thyroid problems or taken thyroid medication?: No   Do you have any endocrine problems?: No  Have you ever been in a major accident or suffered serious trauma?: No  Within the last year, has anyone hit, slapped, kicked or otherwise hurt you?: No  In the last year, has anyone forced you to have sex when you didn't want to?: No    Past Medical History 2   Have you ever received a blood transfusion?: No  Would you refuse a blood transfusion if a doctor judged it to be medically necessary?: No   If you answered Yes, would you rather die than receive a blood transfusion?: No  If you answered Yes, is this for Confucianist reasons?: No  Does anyone in your home smoke?: No  Do you use tobacco products?: (!) Yes(trying to quit)  Do you drink beer, wine or hard liquor?: No  Do you use any of the following: marijuana, speed, cocaine, heroin, hallucinogens or other drugs?: No   Is your blood type Rh negative?: No  Have you ever had abnormal antibodies in your blood?: No  Have you ever had asthma?: No  Have you ever had tuberculosis?: No  Do you have any allergies to drugs or over-the-counter medications?: No  Allergies: Dust Mites, Aspartame, Ethanol, Venlafaxine, Hydrochloride, Sertraline: No  Have you had any breast problems?: No  Have you ever ?: No  Have you had any gynecological surgical procedures such as cervical conization, a LEEP procedure, laser treatment, cryosurgery of the cervix or a dilation and curettage, etc?: No  Have you ever had any other surgical procedures?: No  Have you been hospitalized for a nonsurgical reason excluding normal delivery?: No  Have you ever had any anesthetic complications?: No  Have you ever had an abnormal pap smear?: No    Past Medical History (Continued)  Do you  have a history of abnormalities of the uterus?: No  Did your mother take NOL[place] or any other hormones when she was pregnant with you?: No  Did it take you more than a year to become pregnant?: No  Have you ever been evaluated or treated for infertility?: No  Is there a history of medical problems in your family, which you feel may be important to this pregnancy?: No  Do you have any other problems we have not asked about which you feel may be important to this pregnancy?: No         Has the patient, baby's father or anyone in either family had:  Thalassemia (Italian, Greek, Mediterranean or  background only) and an MCV result less than 80?: No  Neural tube defect such as meningomyelocele, spina bifida or anencephaly?: No  Congenital heart defect?: No  Down's Syndrome?: (!) Yes(FOB's uncle)  Jimi-Sachs disease (Zoroastrianism, Cajun, Belarusian-Bruneian)?: No  Sickle cell disease or trait ()?: No  Hemophilia or other inherited problems of blood?: No  Muscular dystrophy?: No  Cystic fibrosis?: No  McCurtain's chorea?: No  Mental retardation/autism?: No  If yes, was the person tested for fragile X?: No  Any other inherited genetic or chromosomal disorder?: No  Maternal metabolic disorder (e.g Insulin-dependent diabetes, PKU)?: No  A child with birth defects not listed above?: No  Recurrent pregnancy loss or stillbirth?: No   Has the patient had any medications/street drugs/alcohol since her last menstrual period?: No  Does the patient or baby's father have any other genetic risks?: No    Infection History   Do you object to being tested for Hepatitis B?: No  Do you object to being tested for HIV?: No   Do you feel that you are at high risk for coming in contact with the AIDS virus?: No  Have you ever been treated for tuberculosis?: No  Have you ever had a positive skin test for tuberculosis?: No  Do you live with someone who has tuberculosis?: No  Have you ever been exposed to tuberculosis?: No  Do you have genital  herpes?: No  Does your partner have genital herpes?: No  Have you had a viral illness since your last period?: No  Have you ever had gonorrhea, chlamydia, syphilis, venereal warts, trichomoniasis, pelvic inflammatory disease or any other sexually transmitted disease?: (!) Yes([place] a year ago. [place])  Do you know if you are a genital group B streptococcus carrier?: Unknown  Have you had chicken pox/varicella?: (!) Yes(lab shows immunity)   Have you been vaccinated against chicken Pox?: Unknown  Have you had any other infectious diseases?: No  Anastacia Purdy RN

## 2019-11-13 DIAGNOSIS — Z34.90 SUPERVISION OF NORMAL PREGNANCY: ICD-10-CM

## 2019-12-04 ENCOUNTER — PRENATAL OFFICE VISIT (OUTPATIENT)
Dept: OBGYN | Facility: CLINIC | Age: 24
End: 2019-12-04
Payer: COMMERCIAL

## 2019-12-04 VITALS — BODY MASS INDEX: 36.12 KG/M2 | WEIGHT: 230.1 LBS | DIASTOLIC BLOOD PRESSURE: 64 MMHG | SYSTOLIC BLOOD PRESSURE: 90 MMHG

## 2019-12-04 DIAGNOSIS — Z34.01 ENCOUNTER FOR SUPERVISION OF NORMAL FIRST PREGNANCY IN FIRST TRIMESTER: Primary | ICD-10-CM

## 2019-12-04 PROCEDURE — 99203 OFFICE O/P NEW LOW 30 MIN: CPT | Performed by: ADVANCED PRACTICE MIDWIFE

## 2019-12-04 NOTE — NURSING NOTE
"Chief Complaint   Patient presents with     Prenatal Care     NPN visit   Nurse visit done via telephone on 2019       Initial BP 90/64 (BP Location: Left arm, Patient Position: Chair, Cuff Size: Adult Large)   Wt 104.4 kg (230 lb 1.6 oz)   LMP 2019   Breastfeeding No   BMI 36.12 kg/m   Estimated body mass index is 36.12 kg/m  as calculated from the following:    Height as of 19: 1.7 m (5' 6.93\").    Weight as of this encounter: 104.4 kg (230 lb 1.6 oz).  BP completed using cuff size: large    Questioned patient about current smoking habits.  Pt. currently smokes.  Advised about smoking cessation.          The following HM Due:   G/C due    +Nausea and vomiting   +Hair loss ??  +Cramping  And Constipation    Gilma Richey, CMA on 2019 at 11:39 AM         "

## 2019-12-04 NOTE — PROGRESS NOTES
Minoo William is a 24 year old ,  who is not a previous CNM patient. She presents for a new OB Visit. This was not a planned pregnancy.     FOB is Melvin who is in good health.  FOB IS actively involved in relationship and this pregnancy.       She has not had bleeding since her LMP.    She denies abdominal pain since her LMP. Cramping with constipation   She has had nausea.  has had vomiting.   Any personal or family history of blood clots? No  History of sickle cell anemia or trait? No         Patient's last menstrual period was 2019..  Estimated Date of Delivery: 2020 Ultrasound consistent with LMP.    MENSTRUAL HISTORY    frequency: every 28 days  Last PAP:    History of abnormal Pap?  No    Health maintenance updated:  yes        Current medications are:    Current Outpatient Medications:      Prenatal Vit-Fe Fumarate-FA (PRENATAL MULTIVITAMIN W/IRON) 27-0.8 MG tablet, Take 1 tablet by mouth daily, Disp: 90 tablet, Rfl: 3     INFECTION HISTORY  HIV: No  Hepatitis B: No  Hepatitis C: No  Tuberculosis: No    Genital Herpes self: no  Herpes partner:  no  Chlamydia:  yes  Gonorrhea:  no  HPV: No  BV:  No  Syphilis:  No  Chicken Pox:  Yes -       OB HISTORY  OB History    Para Term  AB Living   1 0 0 0 0 0   SAB TAB Ectopic Multiple Live Births   0 0 0 0 0      # Outcome Date GA Lbr Dalton/2nd Weight Sex Delivery Anes PTL Lv   1 Current                History of GDM: No,  PTL : No,  History of HTN in pregnancy: No,  Thrombocytopenia: No,  Shoulder dystocia: No,  Vacuum Extraction: No  PPH: No   3rd of 4th degree laceration: No.   Other complications: No    PERSONAL HISTORY  Exercise Habits:  walking 1-2 days per week.  Employment: Part time.  Her job involves light activity with little potential for toxic exposure.    Travel plans:  are none planned.   Diet: follows a balanced nutrition diet  Abuse concerns? No  Hgb A1c screen:  BMI > 30: Yes, 1st degree family DM: No,  History of GDM: No, PCOS: No, High risk ethnicity: No    Social History     Socioeconomic History     Marital status: Single     Spouse name: Not on file     Number of children: Not on file     Years of education: Not on file     Highest education level: Some college, no degree   Occupational History     Occupation: packaging   Social Needs     Financial resource strain: Not hard at all     Food insecurity:     Worry: Never true     Inability: Never true     Transportation needs:     Medical: No     Non-medical: No   Tobacco Use     Smoking status: Current Every Day Smoker     Packs/day: 0.25     Smokeless tobacco: Never Used   Substance and Sexual Activity     Alcohol use: No     Drug use: No     Sexual activity: Yes     Partners: Male   Lifestyle     Physical activity:     Days per week: Not on file     Minutes per session: Not on file     Stress: Not on file   Relationships     Social connections:     Talks on phone: Not on file     Gets together: Not on file     Attends Jehovah's witness service: Not on file     Active member of club or organization: Not on file     Attends meetings of clubs or organizations: Not on file     Relationship status: Not on file     Intimate partner violence:     Fear of current or ex partner: Not on file     Emotionally abused: Not on file     Physically abused: Not on file     Forced sexual activity: Not on file   Other Topics Concern     Not on file   Social History Narrative     Not on file       She  reports that she has been smoking. She has been smoking about 0.25 packs per day. She has never used smokeless tobacco.  Tobacco Cessation Action Plan: Information offered: Patient not interested at this time  Patient is weaning with her own method that is working   STD testing offered?  Accepted  Last PHQ-9 score on record = No flowsheet data found.  Last GAD7 score on record = No flowsheet data found.  Alcohol Score = no   Referral/Meds needed? yes    PAST MEDICAL/SURGICAL HISTORY  No  past medical history on file.  Past Surgical History:   Procedure Laterality Date     EXTRACTION(S) DENTAL         FAMILY HISTORY  Family History   Problem Relation Age of Onset     Hypertension Mother      Breast Cancer Maternal Grandmother          ROS:  12 point review of systems negative other than symptoms noted below or in the HPI.      PHYSICAL EXAM  Vitals: LMP 2019   BMI= There is no height or weight on file to calculate BMI.     GENERAL:  24 year old pleasant pregnant female, alert, cooperative and well groomed.  NECK:  Thyroid without enlargement and nodules.  Lymph nodes not palpable.   LUNGS:  Clear to auscultation.  BREAST:  Deferred   HEART:  RRR without murmur.  ABDOMEN: Soft without masses or tenderness.  No scars noted..  GENITALIA: deferred     VAGINA:  deferred     CERVIX:   deferred     UTERUS:  nontender 9 weeks in size.  ADNEXA: Without masses or tenderness  RECTAL:  Normal appearance.  Digital exam deferred.  LOWER EXTREMITIES: No edema. No significant varicosities.    ASSESSMENT/PLAN:    IUP at 9w6d    ICD-10-CM    1. Encounter for supervision of normal first pregnancy in first trimester Z34.01         consult for US for AMA patients: NA  Genetic Testing reviewed and discussed, patient desires to check with FOB. Handout provided    COUNSELING    Instructed on use of triage nurse line and contacting the on call CNM after hours in an emergency.     Symptoms of N&V and fatigue usually start to resolve around 12-16 weeks     Reviewed CNM philosophy, call schedule for labor and delivery, and FR for delivery    1st OB handout given outlining appointment spacing and CNM information    Reviewed exercise and nutrition    Recommend to gain 15 pounds with her pregnancy.    Discussed OTC medications. OB med list given    Encouraged patient to arrange  if needed    Encouraged patient to take PNV's/DHA    Travel precautions discussed, no air travel after 36 weeks and Zika Virus  discussed    Will call patient with lab results when available    F/U to be addressed next visit:  return to clinic 4 weeks, Rx's given, referrals    Will return to the clinic in 4 weeks for her next routine prenatal check.  Will call to be seen sooner if problems arise.      Mounika Portillo, DNP, APRN, CNM, IBCLC

## 2020-01-03 ENCOUNTER — PRENATAL OFFICE VISIT (OUTPATIENT)
Dept: OBGYN | Facility: CLINIC | Age: 25
End: 2020-01-03
Payer: COMMERCIAL

## 2020-01-03 VITALS — BODY MASS INDEX: 36.9 KG/M2 | DIASTOLIC BLOOD PRESSURE: 68 MMHG | SYSTOLIC BLOOD PRESSURE: 112 MMHG | WEIGHT: 235.1 LBS

## 2020-01-03 DIAGNOSIS — Z11.3 ROUTINE SCREENING FOR STI (SEXUALLY TRANSMITTED INFECTION): ICD-10-CM

## 2020-01-03 DIAGNOSIS — Z23 NEED FOR PROPHYLACTIC VACCINATION AND INOCULATION AGAINST INFLUENZA: ICD-10-CM

## 2020-01-03 DIAGNOSIS — Z34.02 PREGNANCY, SUPERVISION OF FIRST, SECOND TRIMESTER: Primary | ICD-10-CM

## 2020-01-03 PROCEDURE — 87591 N.GONORRHOEAE DNA AMP PROB: CPT | Performed by: ADVANCED PRACTICE MIDWIFE

## 2020-01-03 PROCEDURE — 87491 CHLMYD TRACH DNA AMP PROBE: CPT | Performed by: ADVANCED PRACTICE MIDWIFE

## 2020-01-03 PROCEDURE — 90686 IIV4 VACC NO PRSV 0.5 ML IM: CPT | Performed by: ADVANCED PRACTICE MIDWIFE

## 2020-01-03 PROCEDURE — 99207 ZZC PRENATAL VISIT: CPT | Performed by: ADVANCED PRACTICE MIDWIFE

## 2020-01-03 PROCEDURE — 90471 IMMUNIZATION ADMIN: CPT | Performed by: ADVANCED PRACTICE MIDWIFE

## 2020-01-03 NOTE — PROGRESS NOTES
S:Feels well, having groin/abdominal pain with walking. Likely round ligament pain.   Fetal movement No  Denies loss of fluid/vb/contractions/pelvic pain    O:  LMP 09/17/2019  /68 (BP Location: Right arm, Patient Position: Chair, Cuff Size: Adult Regular)   Wt 106.6 kg (235 lb 1.6 oz)   LMP 09/17/2019   Breastfeeding No   BMI 36.90 kg/m      Exam:  Constitutional: healthy, alert and no distress  Respiratory: Respirations even and unlabored  Gastrointestinal: Abdomen soft, non-tender. Fundus measures appropriately for gestational age. Fetal heart tones heard easily.  Psychiatric: mentation appears normal and affect normal/bright  A: (Z34.02) Pregnancy, supervision of first, second trimester  (primary encounter diagnosis)  Comment: ordered   Plan: Chlamydia trachomatis PCR, Neisseria         gonorrhoeae PCR, order for DME, US OB > 14         Weeks        -belly band order given discussed stretching, ice, heat, tylenol for round ligament pain     (Z11.3) Routine screening for STI (sexually transmitted infection)  Comment: ordered   Plan: Chlamydia trachomatis PCR, Neisseria         gonorrhoeae PCR            (Z23) Need for prophylactic vaccination and inoculation against influenza  Comment: ordered   Plan: INFLUENZA VACCINE IM > 6 MONTHS VALENT IIV4         [53253]        Given     P: Anatomy ultrasound next visit between 20-22 weeks  Return to clinic 4 weeks    Mounika Portillo DNP, APRN, CNM, IBCLC

## 2020-01-03 NOTE — NURSING NOTE
"Chief Complaint   Patient presents with     Prenatal Care     Needs G/C       Initial /68 (BP Location: Right arm, Patient Position: Chair, Cuff Size: Adult Regular)   Wt 106.6 kg (235 lb 1.6 oz)   LMP 2019   Breastfeeding No   BMI 36.90 kg/m   Estimated body mass index is 36.9 kg/m  as calculated from the following:    Height as of 19: 1.7 m (5' 6.93\").    Weight as of this encounter: 106.6 kg (235 lb 1.6 oz).  BP completed using cuff size: regular    Questioned patient about current smoking habits.  Pt. currently smokes.  Advised about smoking cessation.    14w1d      The following HM Due: G/C due       +Right lower ligament   +Feeling better from nausea       Gilma Richey, CMA on 1/3/2020 at 9:23 AM               "

## 2020-01-05 LAB
C TRACH DNA SPEC QL NAA+PROBE: NEGATIVE
N GONORRHOEA DNA SPEC QL NAA+PROBE: NEGATIVE
SPECIMEN SOURCE: NORMAL
SPECIMEN SOURCE: NORMAL

## 2020-01-31 ENCOUNTER — PRENATAL OFFICE VISIT (OUTPATIENT)
Dept: OBGYN | Facility: CLINIC | Age: 25
End: 2020-01-31
Payer: COMMERCIAL

## 2020-01-31 VITALS — WEIGHT: 235.9 LBS | DIASTOLIC BLOOD PRESSURE: 62 MMHG | BODY MASS INDEX: 37.03 KG/M2 | SYSTOLIC BLOOD PRESSURE: 114 MMHG

## 2020-01-31 DIAGNOSIS — Z34.02 PREGNANCY, SUPERVISION OF FIRST, SECOND TRIMESTER: Primary | ICD-10-CM

## 2020-01-31 PROCEDURE — 99207 ZZC PRENATAL VISIT: CPT | Performed by: ADVANCED PRACTICE MIDWIFE

## 2020-01-31 NOTE — NURSING NOTE
"Chief Complaint   Patient presents with     Prenatal Care     18 weeks 1 day       Initial /62 (BP Location: Left arm, Patient Position: Chair, Cuff Size: Adult Large)   Wt 107 kg (235 lb 14.4 oz)   LMP 2019   Breastfeeding No   BMI 37.03 kg/m   Estimated body mass index is 37.03 kg/m  as calculated from the following:    Height as of 19: 1.7 m (5' 6.93\").    Weight as of this encounter: 107 kg (235 lb 14.4 oz).  BP completed using cuff size: regular    Questioned patient about current smoking habits.  Pt. currently smokes.  Advised about smoking cessation.      18w1d      The following HM Due: NONE    +Thinks starting to feel movement   + Ligament pain is getting better with Binder   +Vomiting is better         Gilma Richey, CMA on 2020 at 4:04 PM              "

## 2020-02-07 DIAGNOSIS — Z34.02 PREGNANCY, SUPERVISION OF FIRST, SECOND TRIMESTER: ICD-10-CM

## 2020-02-11 DIAGNOSIS — O44.42 LOW-LYING PLACENTA IN SECOND TRIMESTER: Primary | ICD-10-CM

## 2020-02-12 ENCOUNTER — TRANSCRIBE ORDERS (OUTPATIENT)
Dept: MATERNAL FETAL MEDICINE | Facility: CLINIC | Age: 25
End: 2020-02-12

## 2020-02-12 DIAGNOSIS — O26.90 PREGNANCY RELATED CONDITION, ANTEPARTUM: Primary | ICD-10-CM

## 2020-02-20 ENCOUNTER — PRE VISIT (OUTPATIENT)
Dept: MATERNAL FETAL MEDICINE | Facility: CLINIC | Age: 25
End: 2020-02-20

## 2020-02-21 ENCOUNTER — TELEPHONE (OUTPATIENT)
Dept: OBGYN | Facility: CLINIC | Age: 25
End: 2020-02-21

## 2020-02-21 NOTE — TELEPHONE ENCOUNTER
Pt went home, was able to have a BM, drank water as suggested and laid down.  Now feels worse and has a headache.  Denies burning with urination, increased frequency or significant urinary symptoms.    No heavy lifting at work, no change in activity.    States she feels more warm than usuasal, has not taken her temp.  Denies any body aches.      Pt advised to take Tylenol, lay down to rest.  We will consult the midwife on call and call pt back.    Latonia Austin RN

## 2020-02-21 NOTE — TELEPHONE ENCOUNTER
Michelle Subramanian notified, recommends encouraging pt to increase fluids (drink 1 liter in the next hour), rest for a couple hours and call back if symptoms worsen.    Pt agreeable with plan.    Latonia Austin RN

## 2020-02-21 NOTE — TELEPHONE ENCOUNTER
Pt calling. States that she has been having some menstrual like cramping on and off since late last night.  Denies vomiting. Some nausea noted.  Denies diarrhea.   Last BM was yesterday. No concerns with constipation.   Denies vag bleeding.    Doesn't really feel fetal movements consistantly at this time.     Pt is currently at work. I recommended that the go home. She should drink 2 big glasses of water and then lay on her left side. If the cramping continues, or symptoms change, she should call us back right away.     Lupis GALEANA, RN

## 2020-02-25 ENCOUNTER — HOSPITAL ENCOUNTER (OUTPATIENT)
Dept: ULTRASOUND IMAGING | Facility: CLINIC | Age: 25
Discharge: HOME | End: 2020-02-25
Attending: ADVANCED PRACTICE MIDWIFE | Admitting: ADVANCED PRACTICE MIDWIFE
Payer: COMMERCIAL

## 2020-02-25 ENCOUNTER — OFFICE VISIT (OUTPATIENT)
Dept: MATERNAL FETAL MEDICINE | Facility: CLINIC | Age: 25
End: 2020-02-25
Attending: ADVANCED PRACTICE MIDWIFE
Payer: COMMERCIAL

## 2020-02-25 DIAGNOSIS — Z36.3 ENCOUNTER FOR ROUTINE SCREENING FOR MALFORMATION USING ULTRASOUND: ICD-10-CM

## 2020-02-25 DIAGNOSIS — O26.90 PREGNANCY RELATED CONDITION, ANTEPARTUM: ICD-10-CM

## 2020-02-25 DIAGNOSIS — O35.9XX0 SUSPECTED FETAL ANOMALY, ANTEPARTUM, SINGLE OR UNSPECIFIED FETUS: ICD-10-CM

## 2020-02-25 DIAGNOSIS — Z03.73 SUSPECTED FETAL ANOMALY NOT FOUND: ICD-10-CM

## 2020-02-25 DIAGNOSIS — O99.212 OBESITY AFFECTING PREGNANCY IN SECOND TRIMESTER: Primary | ICD-10-CM

## 2020-02-25 PROCEDURE — 76811 OB US DETAILED SNGL FETUS: CPT

## 2020-02-25 NOTE — PROGRESS NOTES
Please see ultrasound report under imaging tab for details on ultrasound performed today.    Ginny Angela MD  , OB/GYN  Maternal-Fetal Medicine  [email or web address]  [phone] (Academic office)  [phone] (Pager)

## 2020-02-25 NOTE — LETTER
2/25/2020      To Whom It May Concern:    Minoo William was seen at our clinic today for an appointment at 0930, please excuse her from work due to this appointment.     Sincerely,        Dr. Ginny Angela

## 2020-02-28 ENCOUNTER — PRENATAL OFFICE VISIT (OUTPATIENT)
Dept: OBGYN | Facility: CLINIC | Age: 25
End: 2020-02-28
Payer: COMMERCIAL

## 2020-02-28 VITALS — WEIGHT: 237 LBS | DIASTOLIC BLOOD PRESSURE: 64 MMHG | SYSTOLIC BLOOD PRESSURE: 100 MMHG | BODY MASS INDEX: 37.2 KG/M2

## 2020-02-28 DIAGNOSIS — Z34.02 PREGNANCY, SUPERVISION OF FIRST, SECOND TRIMESTER: Primary | ICD-10-CM

## 2020-02-28 PROCEDURE — 99207 ZZC PRENATAL VISIT: CPT | Performed by: ADVANCED PRACTICE MIDWIFE

## 2020-02-28 NOTE — LETTER
February 28, 2020      Minoo William  [address] [address]        To Whom It May Concern,     Please excuse Minoo William from work on 2/21/20 and 2/28/20 as she was being seen in my care for her current pregnancy. If you have any questions, please feel free to reach out to me.      Sincerely,        Michelle Subramanian CNM

## 2020-02-28 NOTE — PROGRESS NOTES
S: Feels well. Reports she has been having some stressors in with friends and loss of family member that have made her want to smoke a bit more. States she is smoking about 3-4 cigarettes per day, which is significantly less than she has been. Has not tried nicotine patch or gum.  Baby active.  Denies uterine cramping, vaginal bleeding or leaking of fluid  O: Vitals: /64 (BP Location: Left arm, Cuff Size: Adult Regular)   Wt 107.5 kg (237 lb)   LMP 09/17/2019   BMI 37.20 kg/m    BMI= Body mass index is 37.2 kg/m .  Exam:  Constitutional: healthy, alert and no distress  Respiratory: respirations even and unlabored  Gastrointestinal: Abdomen soft, non-tender. Fundus measures appropriate for gestational age. Fetal heart tones hear without difficulty and within normal limits  : Deferred  Psychiatric: mentation appears normal and affect normal/bright  A:   Encounter Diagnosis   Name Primary?     Pregnancy, supervision of first, second trimester Yes     P: Due to needing to take time off of work, patient would like to have GCT/RPR at next visit.  Need for Rhogam? No, to be done next visit   Patient not interested in nicotine patch/gum at this time.  Encouraged patient to call with any questions or concerns.  Return to clinic 4 weeks    Michelle Subramanian CNM on 2/28/2020 at 5:22 PM

## 2020-02-28 NOTE — NURSING NOTE
"Chief Complaint   Patient presents with     Prenatal Care       Initial /64 (BP Location: Left arm, Cuff Size: Adult Regular)   Wt 107.5 kg (237 lb)   LMP 2019   BMI 37.20 kg/m   Estimated body mass index is 37.2 kg/m  as calculated from the following:    Height as of 19: 1.7 m (5' 6.93\").    Weight as of this encounter: 107.5 kg (237 lb).  BP completed using cuff size: regular    Questioned patient about current smoking habits.  Pt. Currently smokes          Rick Morton, RUBENS             "

## 2020-03-17 ENCOUNTER — HOSPITAL ENCOUNTER (OUTPATIENT)
Dept: ULTRASOUND IMAGING | Facility: CLINIC | Age: 25
End: 2020-03-17
Attending: OBSTETRICS & GYNECOLOGY
Payer: COMMERCIAL

## 2020-03-17 ENCOUNTER — OFFICE VISIT (OUTPATIENT)
Dept: MATERNAL FETAL MEDICINE | Facility: CLINIC | Age: 25
End: 2020-03-17
Attending: OBSTETRICS & GYNECOLOGY
Payer: COMMERCIAL

## 2020-03-17 DIAGNOSIS — O99.212 OBESITY AFFECTING PREGNANCY IN SECOND TRIMESTER: Primary | ICD-10-CM

## 2020-03-17 DIAGNOSIS — O35.9XX0 SUSPECTED FETAL ANOMALY, ANTEPARTUM, SINGLE OR UNSPECIFIED FETUS: ICD-10-CM

## 2020-03-17 PROCEDURE — 76816 OB US FOLLOW-UP PER FETUS: CPT

## 2020-03-17 NOTE — PROGRESS NOTES
Please refer to ultrasound report under 'Imaging' Studies of 'Chart Review' tabs.    Dae Yi M.D.

## 2020-03-26 ENCOUNTER — VIRTUAL VISIT (OUTPATIENT)
Dept: OBGYN | Facility: CLINIC | Age: 25
End: 2020-03-26
Payer: COMMERCIAL

## 2020-03-26 DIAGNOSIS — Z34.02 PREGNANCY, SUPERVISION OF FIRST, SECOND TRIMESTER: Primary | ICD-10-CM

## 2020-03-26 PROCEDURE — 99207 ZZC PRENATAL VISIT: CPT | Performed by: ADVANCED PRACTICE MIDWIFE

## 2020-03-26 NOTE — NURSING NOTE
"Chief Complaint   Patient presents with     Prenatal Care     26w 0d       Initial LMP 2019  Estimated body mass index is 37.2 kg/m  as calculated from the following:    Height as of 19: 1.7 m (5' 6.93\").    Weight as of 20: 107.5 kg (237 lb).  BP completed using cuff size: regular    Questioned patient about current smoking habits.  Pt. has never smoked.          Concerns about COVID-19    Question about when to do 1 hour glucose.    +FM    Rick Morton, CMA           "

## 2020-03-26 NOTE — PROGRESS NOTES
"Minoo William is a 24 year old female who is being evaluated via a billable telephone visit.      The patient has been notified of following:     \"This telephone visit will be conducted via a call between you and your physician/provider. We have found that certain health care needs can be provided without the need for a physical exam.  This service lets us provide the care you need with a short phone conversation.  If a prescription is necessary we can send it directly to your pharmacy.  If lab work is needed we can place an order for that and you can then stop by our lab to have the test done at a later time.    If during the course of the call the physician/provider feels a telephone visit is not appropriate, you will not be charged for this service.\"     Physician has received verbal consent for a Telephone Visit from the patient? Yes    Minoo William complains of  26w 0d    I have reviewed and updated the patient's Past Medical History, Social History, Family History and Medication List.    ALLERGIES  Patient has no known allergies.    Additional provider notes:    S: Feels well. Has been noting some increased RL pain and restlessness around 3am. Patient has questions about COVID. States that since she is pregnant and her employer is deemed an \"essential service\" they are offering her 5 months paid leave to help decrease her risk of exposure to the virus. States her leave will start on next Monday.  Baby active.  Denies uterine cramping, vaginal bleeding or leaking of fluid  O: Vitals: LMP 09/17/2019   BMI= There is no height or weight on file to calculate BMI.  Exam:  Respiratory: respirations audibly even and unlabored   Psychiatric: mentation appears normal and affect normal/bright    Assessment/Plan:  Encounter Diagnosis   Name Primary?     Pregnancy, supervision of first, second trimester Yes       Discussed GCT/repeat RPR for next visit, handout provided, reminded of longer appointment.  Tdap next visit; " reviewed CDC recommendations and partner/family vaccination recommended as well.  Need for Rhogam? No, to be done next visit   Encouraged patient to call with any questions or concerns.  Return to clinic 2 weeks for in-person visit.    Phone call duration: 15 minutes    Michelle Subramanian CNM

## 2020-04-08 ENCOUNTER — PRENATAL OFFICE VISIT (OUTPATIENT)
Dept: OBGYN | Facility: CLINIC | Age: 25
End: 2020-04-08
Payer: COMMERCIAL

## 2020-04-08 VITALS — BODY MASS INDEX: 37.04 KG/M2 | DIASTOLIC BLOOD PRESSURE: 70 MMHG | SYSTOLIC BLOOD PRESSURE: 120 MMHG | WEIGHT: 236 LBS

## 2020-04-08 DIAGNOSIS — Z34.03 SUPERVISION OF LOW-RISK FIRST PREGNANCY, THIRD TRIMESTER: Primary | ICD-10-CM

## 2020-04-08 DIAGNOSIS — Z34.02 PREGNANCY, SUPERVISION OF FIRST, SECOND TRIMESTER: ICD-10-CM

## 2020-04-08 DIAGNOSIS — Z23 NEED FOR TDAP VACCINATION: ICD-10-CM

## 2020-04-08 LAB
DIFFERENTIAL METHOD BLD: ABNORMAL
EOSINOPHIL # BLD AUTO: 0.2 10E9/L (ref 0–0.7)
EOSINOPHIL NFR BLD AUTO: 1 %
ERYTHROCYTE [DISTWIDTH] IN BLOOD BY AUTOMATED COUNT: 13.1 % (ref 10–15)
HCT VFR BLD AUTO: 35.2 % (ref 35–47)
HGB BLD-MCNC: 11.4 G/DL (ref 11.7–15.7)
HYPOCHROMIA BLD QL: PRESENT
LYMPHOCYTES # BLD AUTO: 2 10E9/L (ref 0.8–5.3)
LYMPHOCYTES NFR BLD AUTO: 13 %
MCH RBC QN AUTO: 33.7 PG (ref 26.5–33)
MCHC RBC AUTO-ENTMCNC: 32.4 G/DL (ref 31.5–36.5)
MCV RBC AUTO: 104 FL (ref 78–100)
MONOCYTES # BLD AUTO: 0.8 10E9/L (ref 0–1.3)
MONOCYTES NFR BLD AUTO: 5 %
NEUTROPHILS # BLD AUTO: 12.2 10E9/L (ref 1.6–8.3)
NEUTROPHILS NFR BLD AUTO: 81 %
PLATELET # BLD AUTO: 285 10E9/L (ref 150–450)
PLATELET # BLD EST: ABNORMAL 10*3/UL
RBC # BLD AUTO: 3.38 10E12/L (ref 3.8–5.2)
WBC # BLD AUTO: 15.2 10E9/L (ref 4–11)

## 2020-04-08 PROCEDURE — 36415 COLL VENOUS BLD VENIPUNCTURE: CPT | Performed by: ADVANCED PRACTICE MIDWIFE

## 2020-04-08 PROCEDURE — 90715 TDAP VACCINE 7 YRS/> IM: CPT | Performed by: ADVANCED PRACTICE MIDWIFE

## 2020-04-08 PROCEDURE — 99207 ZZC PRENATAL VISIT: CPT | Performed by: ADVANCED PRACTICE MIDWIFE

## 2020-04-08 PROCEDURE — 85025 COMPLETE CBC W/AUTO DIFF WBC: CPT | Performed by: ADVANCED PRACTICE MIDWIFE

## 2020-04-08 PROCEDURE — 90471 IMMUNIZATION ADMIN: CPT | Performed by: ADVANCED PRACTICE MIDWIFE

## 2020-04-08 PROCEDURE — 82950 GLUCOSE TEST: CPT | Performed by: ADVANCED PRACTICE MIDWIFE

## 2020-04-08 PROCEDURE — 86780 TREPONEMA PALLIDUM: CPT | Performed by: ADVANCED PRACTICE MIDWIFE

## 2020-04-08 NOTE — PATIENT INSTRUCTIONS
Weeks 27 thru 32 - Gestational Age (Fetal Age - Weeks 25 thru 30):  The fetus really fills out over these next few weeks, storing fat on the body, reaching about 15-17 inches long and weighing about 4-4   lbs by the 32nd week. The lungs are not fully mature yet, but some rhythmic breathing movements are occurring. The bones are fully developed, but are still soft and pliable. The fetus is storing its own calcium, iron and phosphorus. The eyelids open after being closed, since the end of the first trimester.

## 2020-04-08 NOTE — PROGRESS NOTES
S: Feels well, no concerns. Baby active.  Denies uterine cramping, vaginal bleeding, or leaking of fluid.    O: Vitals: /70   Wt 107 kg (236 lb)   LMP 09/17/2019   Breastfeeding No   BMI 37.04 kg/m    BMI= Body mass index is 37.04 kg/m .  Exam:  Constitutional: Healthy, alert and no distress  Respiratory: respirations even and unlabored  Gastrointestinal: Abdomen soft, non-tender. Fundus measures appropriate for gestational age. Fetal heart tones heard without difficulty and within normal limits.  : Deferred  Psychiatric: Mentation appears normal and affect normal/bright    A:     ICD-10-CM    1. Pregnancy, supervision of first, second trimester  Z34.02    2. Supervision of low-risk first pregnancy, third trimester  Z34.03 CBC with platelets     Treponema Abs w Reflex to RPR and Titer     Glucose tolerance, gest screen, 1 hour   3. Need for Tdap vaccination  Z23 TDAP, IM (10 - 64 YRS) - Adacel     ADMIN 1st VACCINE       P: GCT/repeat CBC/RPR today, handout provided.  Tdap given; reviewed CDC recommendations and partner/family vaccination recommended as well.  Need for Rhogam? No.   Discussed patient options for postpartum contraception. Patient is planning Nexplanon. Has used this method in the past.  Encouraged patient to call with any questions or concerns.  Plan next routine prenatal visit in 2 weeks over the phone.    Antonia Redmond, DNP, APRN, CNM

## 2020-04-09 LAB
GLUCOSE 1H P 50 G GLC PO SERPL-MCNC: 105 MG/DL (ref 60–129)
T PALLIDUM AB SER QL: NONREACTIVE

## 2020-04-22 ENCOUNTER — VIRTUAL VISIT (OUTPATIENT)
Dept: OBGYN | Facility: CLINIC | Age: 25
End: 2020-04-22
Payer: COMMERCIAL

## 2020-04-22 DIAGNOSIS — Z34.03 PREGNANCY, SUPERVISION OF FIRST, THIRD TRIMESTER: Primary | ICD-10-CM

## 2020-04-22 PROCEDURE — 99207 ZZC PRENATAL VISIT: CPT | Performed by: ADVANCED PRACTICE MIDWIFE

## 2020-04-22 NOTE — NURSING NOTE
"Chief Complaint   Patient presents with     Prenatal Care     + FM daily, - bleeding, + mild cramping, + hb, - ha and + edema in feet.       Initial LMP 2019   Breastfeeding No  Estimated body mass index is 37.04 kg/m  as calculated from the following:    Height as of 19: 1.7 m (5' 6.93\").    Weight as of 20: 107 kg (236 lb).  BP completed using cuff size: NA (Not Taken)    Questioned patient about current smoking habits.  Pt. currently smokes.  Advised about smoking cessation.          Yuliana Jcakson LPN               "

## 2020-04-22 NOTE — PROGRESS NOTES
"Minoo William is a 24 year old female who is being evaluated via a billable telephone visit.      The patient has been notified of following:     \"This telephone visit will be conducted via a call between you and your physician/provider. We have found that certain health care needs can be provided without the need for a physical exam.  This service lets us provide the care you need with a short phone conversation.  If a prescription is necessary we can send it directly to your pharmacy.  If lab work is needed we can place an order for that and you can then stop by our lab to have the test done at a later time.    Telephone visits are billed at different rates depending on your insurance coverage. During this emergency period, for some insurers they may be billed the same as an in-person visit.  Please reach out to your insurance provider with any questions.    If during the course of the call the physician/provider feels a telephone visit is not appropriate, you will not be charged for this service.\"    Patient has given verbal consent for Telephone visit?  Yes    How would you like to obtain your AVS? Mail a copy     S: Feels well,  Baby active.  Denies uterine cramping, vaginal bleeding or leaking of fluid. Having heartburn at bedtime. Wearing belly band, states this is helping.   O: Vitals: LMP 09/17/2019   Breastfeeding No   Deferred   BMI= There is no height or weight on file to calculate BMI.  Exam: deferred, telephone visit   A: (Z34.03) Pregnancy, supervision of first, third trimester  (primary encounter diagnosis)  Comment: wnl  Plan: return to clinic 2 weeks       P: CNM pager number given  Discussed strategies for managing heartburn   Encouraged patient to call with any questions or concerns.  Return to clinic 2 weeks    Mouniak Portillo, DNP, APRN, CNM, IBCLC      "

## 2020-04-22 NOTE — PATIENT INSTRUCTIONS
HEARTBURN PREVENTION    Heartburn can be incredibly uncomfortable and bothersome. It can worsen with pregnancy, certain medications, and other illnesses. Try the lifestyle tips and remedies to combat your symptoms. We encourage you to try these first before using medications.      Small frequent meals      Eat slowly       Separate eating solid foods from liquids by 30-60 minutes      Do not eat within 2 hour of going to sleep      Avoid going to sleep right after eating or prop yourself up on pillows to avoid being completely flat      Avoid acidic foods like tomatoes, spicy foods, caffeine, chocolate, peppermint, alcohol, sugar, white bread rice, and pasta (whole grains are easier to digest)      Chew gum or have a throat lozenge before eating to encourage salivation, saliva neutralizes stomach acid       Avoid tobacco and alcohol      Weight loss may also be helpful if you are overweight      NATURAL REMEDIES FOR HEARTBURN  You should avoid overuse of standard over the counter (OTC) antacids since they neutralize hydrochloric acid and arrest digestion.  Neutralized stomach acids later rebound with an increased acid secretion after the antacid wears off, which in turn produces even more stomach acid.  Prolonged use of antacids can lead to diarrhea and constipation with nausea.  Additionally, brucellae, a beneficial bacteria found in some dairy products, is killed by antacids.  It is better to try these natural remedies first.       Papaya tablets, papaya or papaya leaf tea      Fennel seeds, cumin seeds, anise seeds, and dill seed, can be eaten alone or made into tea      Essential oils to massage into the upper abdomen chamomile, fennel, lemon balm, albert sandalwood                                                      Sip a small amount of apple cider vinegar with warm water      Raw almonds      Drink a glass of milk before bed      Supplement with digestive enzymes (can be found in most drug/health food)  "stores    Plain baked potatoes    Comfrey-pepsin tablets (digestive aid)    Eat raw apple peels  Medications    If natural remedies and lifestyle changes fail consider using OTC medications. Many OTC medications are available for heartburn and are often very effective.      Antacids like Tums, Mylanta, Maalox     Pepcid    Prilosec     Most OTC medications are recommended for short term use only, if you continue to have issues despite all of these helpful methods we recommend you be seen by a primary care provider for evaluation.    For questions or concerns please call:    [facility] Certified Nurse Midwives  [phone]    Labor Instructions for Midwife Patients    When to call:  Both during and after office hours call [phone]. There is a Nurse Midwife available to take your calls and answer your questions 24 hours a day.   Please call or text this number.  If you choose to call, you will be prompted to enter the call back number you would like the on call provider to call back.  Please enter a 10 digit phone number. You will hear a few beeps after entering the phone number.  The on-call provider will call you back, perhaps from a blocked phone number. If you do not hear from the on call midwife within 20 minutes, please call our main clinic phone [phone].      When to call:  Call anytime you have important concerns about you or your baby.     Call if:    You are having contractions at regular intervals about 5-6 minutes apart lasting 30-60 seconds and becoming increasingly more intense     You have an uncontrollable gush of fluid from your vagina or feel a pop and gush like your water has broken    You have HEAVY bleeding, like heavy period, blood running down your legs, or  soaking a pad.     Some bleeding after a pelvic exam, after intercourse, or in labor when your cervix is dilating is normal and is referred to as \"bloody show\"    You have severe, continuous back or abdominal pain    You feel " it is time to go to the hospital    If this is your first labor, call when contractions are very intense and have been about every 3-4 minutes for about an hour    If it is your second labor or more, call when contractions are strong and about every 3-5 minutes or sooner depending on your level of discomfort.     Keep in mind we are always here for you! If you have questions, concerns please don't hesitate to call us.     What to eat/drink in labor: Drink plenty of fluid (water most importantly, juice, soda or tea without caffeine). Eat rice, pasta, soup, cereal, bread/toast, and fruit. Avoid dairy and greasy food as they are difficult to digest and you may experience some nausea during labor.    Comfort measures:    Baths and showers (ok even with ruptured membranes, it may temporarily slow contractions if you are still in the early stage of labor)    Warm/hot packs for back pain or discomfort    Back, belly, or thigh massages    Standing, rocking, walking, leaning over bed or tables, side-lying and sleeping    Miscellaneous:     Contractions are timed from the beginning of one to the beginning of the next    Try hard to sleep during the early stage of labor when you are not that uncomfortable. Timing of contractions at this point is not important    Even if you cannot sleep, resting in bed or on the couch can help you maintain your energy for labor    When you arrive at the hospital the nurse will check your baby's heartbeat, check your cervix, and will call us. The midwife on call will come in and be with you when you are in active labor    After hours you need to enter the hospital through the emergency room     Packing your bag    Bring clothing for you and your baby, and anything that may make your birth/stay more comfortable.  It is a good idea to practice installing the car seat before you deliver.  Please do not bring valuables to the hospital.

## 2020-04-30 PROBLEM — O99.333 PREGNANCY COMPLICATED BY TOBACCO USE IN THIRD TRIMESTER: Status: ACTIVE | Noted: 2020-04-30

## 2020-04-30 PROBLEM — O09.93 HIGH-RISK PREGNANCY IN THIRD TRIMESTER: Status: ACTIVE | Noted: 2020-04-30

## 2020-05-07 ENCOUNTER — PRENATAL OFFICE VISIT (OUTPATIENT)
Dept: OBGYN | Facility: CLINIC | Age: 25
End: 2020-05-07
Payer: COMMERCIAL

## 2020-05-07 VITALS — BODY MASS INDEX: 38.14 KG/M2 | SYSTOLIC BLOOD PRESSURE: 118 MMHG | WEIGHT: 243 LBS | DIASTOLIC BLOOD PRESSURE: 66 MMHG

## 2020-05-07 DIAGNOSIS — Z34.03 PREGNANCY, SUPERVISION OF FIRST, THIRD TRIMESTER: Primary | ICD-10-CM

## 2020-05-07 PROCEDURE — 99207 ZZC PRENATAL VISIT: CPT | Performed by: ADVANCED PRACTICE MIDWIFE

## 2020-05-07 NOTE — NURSING NOTE
"Chief Complaint   Patient presents with     Prenatal Care     32w        Initial /66 (BP Location: Right arm, Cuff Size: Adult Large)   Wt 110.2 kg (243 lb)   LMP 2019   BMI 38.14 kg/m   Estimated body mass index is 38.14 kg/m  as calculated from the following:    Height as of 19: 1.7 m (5' 6.93\").    Weight as of this encounter: 110.2 kg (243 lb).  BP completed using cuff size: large    Questioned patient about current smoking habits.  Pt. currently smokes.  Advised about smoking cessation.          The following HM Due: NONE    Stacy Harper CMA           "

## 2020-05-07 NOTE — PROGRESS NOTES
S: Feels well and has no concerns.  Moving to [facility]. Unsure if she wishes to transfer care.   Baby active.  Denies uterine cramping, vaginal bleeding or leaking of fluid  O: Vitals: /66 (BP Location: Right arm, Cuff Size: Adult Large)   Wt 110.2 kg (243 lb)   LMP 09/17/2019   BMI 38.14 kg/m    BMI= Body mass index is 38.14 kg/m .  Exam:  Constitutional: healthy, alert and no distress  Respiratory: respirations even and unlabored  Gastrointestinal: Abdomen soft, non-tender. Fundus measures appropriate for gestational age. Fetal heart tones hear without difficulty and within normal limits  : Deferred  Psychiatric: mentation appears normal and affect normal/bright  A:     ICD-10-CM    1. Pregnancy, supervision of first, third trimester  Z34.03      P: Discussed plans for labor. Discussed patient options for postpartum contraception. Patient is planning Nexplanon  Encouraged patient to call with any questions or concerns.  Return to clinic 2 weeks    ANTON Gastelum, SALO

## 2020-05-19 ENCOUNTER — VIRTUAL VISIT (OUTPATIENT)
Dept: OBGYN | Facility: CLINIC | Age: 25
End: 2020-05-19
Payer: COMMERCIAL

## 2020-05-19 DIAGNOSIS — Z34.03 PREGNANCY, SUPERVISION OF FIRST, THIRD TRIMESTER: Primary | ICD-10-CM

## 2020-05-19 PROCEDURE — 99207 ZZC PRENATAL VISIT: CPT | Performed by: ADVANCED PRACTICE MIDWIFE

## 2020-05-19 NOTE — PATIENT INSTRUCTIONS
Weeks 33 thru 36 - Gestational Age (Fetal Age - Weeks 31 thru 34):  This is about the time that the fetus will descend into the head down position preparing for birth. The fetus is beginning to gain weight more rapidly. The lanugo hair will disappear from the skin, and it is becoming less red and wrinkled. The fetus is now 16-19 inches and weighs anywhere from 5   lbs to 6   lbs.       For our patients -   We recognize that this is a time of great change as you look forward to the birth of your baby. It's also a time of change for us, your healthcare providers.   We look forward to being part of your journey, even if it may be different than you expected. To best protect you and your family, we've made changes at our hospitals. As we learn more about this coronavirus (COVID-19), the points below may change. Please page the CNM on call [phone] before you come to the hospital. We no longer want prenatal patients to come into the hospital through the emergency room after hours. Either the midwife or a nurse from the Birthing Center will meet you at the front door of the hospital to allow entrance.    Here is what you can expect:        All women admitted to the Birthing Center will be tested for Covid 19.    We're limiting visitors to Labor & Delivery and Postpartum to just 1 adult visitor. This 1 adult may be your spouse, partner, family member, friend or . It's your choice who will be your 1 visitor. Your visitor can't have symptoms of COVID-19 (fever, cough, trouble breathing). We will take your visitor's temperature when you arrive and throughout your stay.     Your 1 visitor must stay with you in your room for your whole stay. If they leave the hospital, they can't come back in.     We ask that your visitor bring a mask from home to wear. If your visitor doesn't have a mask, the hospital will provide a mask.     You will have several food options. You and your visitor may order food through hospital room  service. You may bring food with you from home when you begin your stay with us. Finally, you can have food delivered to the designated door at the hospital. You can check with hospital staff to find out where that is.     You and your visitor are not able to go outside to smoke.     Any time you come to the hospital, please have your belongings and your car seat in the car. If the hospital admits you for delivery, you can bring it all in for your stay. Please limit your luggage or bags and car seat to what you can carry in during 1 trip.     If you have a long stay in the hospital before your birth (antepartum hospitalization), we don't allow any visitors during that time. We encourage patients to work with their care teams on other options to connect and get support.     Our knowledge of COVID-19 is constantly growing. We'll keep you updated as things change. Thank you for having your baby at [facility]. We're honored to care for you and your family during this special event.

## 2020-05-19 NOTE — PROGRESS NOTES
"Minoo William is a 24 year old female who is being evaluated via a billable video visit.      The patient has been notified of following:     \"This video visit will be conducted via a call between you and your physician/provider. We have found that certain health care needs can be provided without the need for an in-person physical exam.  This service lets us provide the care you need with a video conversation.  If a prescription is necessary we can send it directly to your pharmacy.  If lab work is needed we can place an order for that and you can then stop by our lab to have the test done at a later time.    Video visits are billed at different rates depending on your insurance coverage.  Please reach out to your insurance provider with any questions.    If during the course of the call the physician/provider feels a video visit is not appropriate, you will not be charged for this service.\"    Patient has given verbal consent for Video visit? Yes    How would you like to obtain your AVS? Mail a copy    Patient would like the video invitation sent by: Text to cell phone: [phone]    Will anyone else be joining your video visit? No        Video-Visit Details    Type of service:  Video Visit    Video Start Time: 125om  Video End Time: 1:39 PM    Originating Location (pt. Location): Home    Distant Location (provider location):  [facility]     Platform used for Video Visit: Bartolome      S: Feels well,  Baby active.  Denies uterine cramping, vaginal bleeding or leaking of fluid. Having some right sided rib pain on her back when standing for long  periods of time. Resolves with rest and massage. Also having increased pelvic pressure, denies urinary symptoms or vaginal symptoms, no cramping or contractions. She is planning a move to [facility], unsure exactly when this will be and if she is planning to transfer care.     O: Vitals: LMP 09/17/2019   BMI= There is no height or weight on file to " calculate BMI.  Exam:  Constitutional: healthy, alert and no distress  Respiratory: respirations even and unlabored  Psychiatric: mentation appears normal and affect normal/bright    A:     ICD-10-CM    1. Pregnancy, supervision of first, third trimester  Z34.03        P: Discussed GBS screen for next visit. Discussed plans for labor. Discussed patient options for postpartum contraception. Patient is planning Nexplanon  Encouraged patient to call with any questions or concerns.  Reviewed that her back pain sounds musculoskeletal, she can try using heat and tylenol as needed. Reviewed s/sx of PEC, warning sx to report.   Return to clinic 2 weeks    ALE Lewis CNM 5/19/2020 1:41 PM

## 2020-06-04 ENCOUNTER — PRENATAL OFFICE VISIT (OUTPATIENT)
Dept: OBGYN | Facility: CLINIC | Age: 25
End: 2020-06-04
Payer: COMMERCIAL

## 2020-06-04 VITALS — WEIGHT: 242 LBS | BODY MASS INDEX: 37.98 KG/M2 | DIASTOLIC BLOOD PRESSURE: 70 MMHG | SYSTOLIC BLOOD PRESSURE: 114 MMHG

## 2020-06-04 DIAGNOSIS — L08.9 PUSTULE: ICD-10-CM

## 2020-06-04 DIAGNOSIS — O09.93 HIGH-RISK PREGNANCY IN THIRD TRIMESTER: Primary | ICD-10-CM

## 2020-06-04 PROCEDURE — 87653 STREP B DNA AMP PROBE: CPT | Performed by: ADVANCED PRACTICE MIDWIFE

## 2020-06-04 PROCEDURE — 99207 ZZC PRENATAL VISIT: CPT | Performed by: ADVANCED PRACTICE MIDWIFE

## 2020-06-04 NOTE — PATIENT INSTRUCTIONS
Weeks 37 thru 40 - Gestational Age (Fetal Age - Weeks 35 thru 38):  At 38 weeks the fetus is considered full term and is ready to make its appearance at any time. As your baby becomes bigger, you may notice a change in fetal movement. If you notice a decrease in fetal movement, make sure to talk with your doctor. The fingernails have grown long and will need to be cut soon after birth. Small breast buds are present on both sexes. The mother is supplying the fetus with antibodies that will help protect against disease. All organs are developed, with the lungs maturing all the way until the day of delivery. The fetus is about 19 - 21 inches in length and weighs anywhere from 6   lbs to 10 lbs.

## 2020-06-04 NOTE — NURSING NOTE
"Chief Complaint   Patient presents with     Prenatal Care     36w. c/o ingrown hair       Initial /70 (BP Location: Left arm, Cuff Size: Adult Regular)   Wt 109.8 kg (242 lb)   LMP 2019   BMI 37.98 kg/m   Estimated body mass index is 37.98 kg/m  as calculated from the following:    Height as of 19: 1.7 m (5' 6.93\").    Weight as of this encounter: 109.8 kg (242 lb).  BP completed using cuff size: regular    Questioned patient about current smoking habits.  Pt. currently smokes.  Advised about smoking cessation.          The following HM Due: NONE    Stacy Harper CMA         "

## 2020-06-04 NOTE — PROGRESS NOTES
S: Feels well,  Baby active.  Denies uterine cramping, vaginal bleeding or leaking of fluid. No headache, increase in edema, no epigastric pain. Pt reports swollen, ingrown hair on labia. Has been present for 3 days, tender to touch. Gets these periodically, usually resolve on their own. Smoking 3-5 cigarettes/day     O: Vitals: /70 (BP Location: Left arm, Cuff Size: Adult Regular)   Wt 109.8 kg (242 lb)   LMP 09/17/2019   BMI 37.98 kg/m    BMI= Body mass index is 37.98 kg/m .  Exam:  Constitutional: healthy, alert and no distress  Respiratory: respirations even and unlabored  Gastrointestinal: Abdomen soft, non-tender. Fundus measures appropriate for gestational age. Fetal heart tones hear without difficulty and within normal limits  : 1cm pustule on right labia, tender to touch, swollen. Minimal redness, warmth   SVE: FT/long/posterior   Psychiatric: mentation appears normal and affect normal/bright    A:     ICD-10-CM    1. High-risk pregnancy in third trimester  O09.93 Strep, Group B by PCR   2. Pustule  L08.9      P: Labor signs and symptoms discussed, aware of numbers to call  Discussed warning signs of PIH/preeclampsia and patient will monitor.  GBS screen completed. Discussed plans for labor. Discussed patient options for postpartum contraception. Patient is planning Nexplanon  Encouraged patient to call with any questions or concerns.  Return to clinic 1 weeks    Encouraged warm compresses 4 times a day on ingrown hair/pustule on labia. If symptoms do not resolve within 48 hours, recommend pt return to clinic to have it drained. Does not appear that it would be able to be easily drained today. No evidence of infection seen on exam, reviewed warning s/sx of infection, pt aware to call.     LAE Lewis CNM 6/4/2020 1:14 PM

## 2020-06-05 ENCOUNTER — TELEPHONE (OUTPATIENT)
Dept: OBGYN | Facility: CLINIC | Age: 25
End: 2020-06-05

## 2020-06-05 LAB
GP B STREP DNA SPEC QL NAA+PROBE: NEGATIVE
SPECIMEN SOURCE: NORMAL

## 2020-06-11 ENCOUNTER — PRENATAL OFFICE VISIT (OUTPATIENT)
Dept: OBGYN | Facility: CLINIC | Age: 25
End: 2020-06-11
Payer: COMMERCIAL

## 2020-06-11 VITALS — DIASTOLIC BLOOD PRESSURE: 72 MMHG | BODY MASS INDEX: 38.3 KG/M2 | SYSTOLIC BLOOD PRESSURE: 108 MMHG | WEIGHT: 244 LBS

## 2020-06-11 DIAGNOSIS — Z34.03 PREGNANCY, SUPERVISION OF FIRST, THIRD TRIMESTER: Primary | ICD-10-CM

## 2020-06-11 PROCEDURE — 99207 ZZC PRENATAL VISIT: CPT | Performed by: ADVANCED PRACTICE MIDWIFE

## 2020-06-11 NOTE — PATIENT INSTRUCTIONS
"Weeks 37 thru 40 - Gestational Age (Fetal Age - Weeks 35 thru 38):  At 38 weeks the fetus is considered full term and is ready to make its appearance at any time. As your baby becomes bigger, you may notice a change in fetal movement. If you notice a decrease in fetal movement, make sure to talk with your doctor. The fingernails have grown long and will need to be cut soon after birth. Small breast buds are present on both sexes. The mother is supplying the fetus with antibodies that will help protect against disease. All organs are developed, with the lungs maturing all the way until the day of delivery. The fetus is about 19 - 21 inches in length and weighs anywhere from 6   lbs to 10 lbs.    Labor Instructions for Midwife Patients    When to call:  Both during and after office hours call [phone]. There is a Nurse Midwife available to take your calls and answer your questions 24 hours a day.     When to call:  Call anytime you have important concerns about you or your baby.     Call if:    You are having contractions at regular intervals about 5-6 minutes apart lasting 30-60 seconds and becoming increasingly more intense     You have an uncontrollable gush of fluid from your vagina or feel a pop and gush like your water has broken    You have HEAVY bleeding, like heavy period, blood running down your legs, or  soaking a pad.     Some bleeding after a pelvic exam, after intercourse, or in labor when your cervix is dilating is normal and is referred to as \"bloody show\"    You have severe, continuous back or abdominal pain    You feel it is time to go to the hospital    If this is your first labor, call when contractions are very intense and have been about every 3-4 minutes for about an hour    If it is your second labor or more, call when contractions are strong and about every 3-5 minutes or sooner depending on your level of discomfort.     Keep in mind we are always here for you! If you have questions, " concerns please don't hesitate to call us.     What to eat/drink in labor: Drink plenty of fluid (water most importantly, juice, soda or tea without caffeine). Eat rice, pasta, soup, cereal, bread/toast, and fruit. Avoid dairy and greasy food as they are difficult to digest and you may experience some nausea during labor.    Comfort measures:    Baths and showers (ok even with ruptured membranes, it may temporarily slow contractions if you are still in the early stage of labor)    Warm/hot packs for back pain or discomfort    Back, belly, or thigh massages    Standing, rocking, walking, leaning over bed or tables, side-lying and sleeping    Miscellaneous:     Contractions are timed from the beginning of one to the beginning of the next    Try hard to sleep during the early stage of labor when you are not that uncomfortable. Timing of contractions at this point is not important    Even if you cannot sleep, resting in bed or on the couch can help you maintain your energy for labor    When you arrive at the hospital the nurse will check your baby's heartbeat, check your cervix, and will call us. The midwife on call will come in and be with you when you are in active labor    After hours please come to front entrance of hospital to avoid ER during Covid pandemic. Doors will be locked, but call number posted for entry and a nurse will escort you to 4th floor.

## 2020-06-11 NOTE — NURSING NOTE
"Chief Complaint   Patient presents with     Prenatal Care     37w, questions about labor and hospital       Initial /72 (Cuff Size: Adult Large)   Wt 110.7 kg (244 lb)   LMP 2019   BMI 38.30 kg/m   Estimated body mass index is 38.3 kg/m  as calculated from the following:    Height as of 19: 1.7 m (5' 6.93\").    Weight as of this encounter: 110.7 kg (244 lb).  BP completed using cuff size: large    Questioned patient about current smoking habits.  Pt. currently smokes.  Advised about smoking cessation.          The following HM Due: NONE  Stacy Harper CMA    "

## 2020-06-11 NOTE — PROGRESS NOTES
S: Feels well, no concerns. Mild, occasional cramping. Baby active.  Denies vaginal bleeding, leaking of fluid, headache, visual changes, epigastric pain, or increase in edema.    O: Vitals: /72 (Cuff Size: Adult Large)   Wt 110.7 kg (244 lb)   LMP 09/17/2019   BMI 38.30 kg/m    BMI= Body mass index is 38.3 kg/m .  Exam:  Constitutional: Healthy, alert and no distress  Respiratory: Respirations even and unlabored  Gastrointestinal: Abdomen soft, non-tender. Fundus measures appropriate for gestational age. Fetal heart tones heard without difficulty and within normal limits. Cephalic per leopold's maneuver.   : Normal external genitalia without lesions; VE: 0.5/thick/-3  Psychiatric: Mentation appears normal and affect normal/bright    A:     ICD-10-CM    1. Pregnancy, supervision of first, third trimester  Z34.03      P: Labor signs and symptoms discussed, aware of numbers to call  Discussed plans for labor. Mom will be support person, as FOB will be working >1 hour away.  Discussed warning signs of PIH/preeclampsia and patient will monitor.  Reviewed counting fetal movement.  Encouraged patient to call with any questions or concerns.  Return to clinic in 1 weeks    Antonia Redmond DNP, APRN, CNM

## 2020-06-17 ENCOUNTER — PRENATAL OFFICE VISIT (OUTPATIENT)
Dept: OBGYN | Facility: CLINIC | Age: 25
End: 2020-06-17
Payer: COMMERCIAL

## 2020-06-17 VITALS — DIASTOLIC BLOOD PRESSURE: 72 MMHG | SYSTOLIC BLOOD PRESSURE: 120 MMHG | BODY MASS INDEX: 38.14 KG/M2 | WEIGHT: 243 LBS

## 2020-06-17 DIAGNOSIS — Z34.03 SUPERVISION OF LOW-RISK FIRST PREGNANCY, THIRD TRIMESTER: Primary | ICD-10-CM

## 2020-06-17 PROCEDURE — 99207 ZZC PRENATAL VISIT: CPT | Performed by: ADVANCED PRACTICE MIDWIFE

## 2020-06-17 NOTE — PROGRESS NOTES
S: Feels well,  Baby active.  Denies uterine cramping, vaginal bleeding or leaking of fluid. No headache, increase in edema, no epigastric pain.   O: Vitals: /72 (BP Location: Left arm, Cuff Size: Adult Regular)   Wt 110.2 kg (243 lb)   LMP 09/17/2019   BMI 38.14 kg/m    BMI= Body mass index is 38.14 kg/m .  Exam:  Constitutional: healthy, alert and no distress  Respiratory: respirations even and unlabored  Gastrointestinal: Abdomen soft, non-tender. Fundus measures appropriate for gestational age. Fetal heart tones hear without difficulty and within normal limits  : Deferred  Psychiatric: mentation appears normal and affect normal/bright  A: No diagnosis found.  P: Labor signs and symptoms discussed, aware of numbers to call  Discussed warning signs of PIH/preeclampsia and patient will monitor.  GBS screen completed. Discussed plans for labor. Discussed patient options for postpartum contraception. Patient is planning nexplanon  Encouraged patient to call with any questions or concerns.  Return to clinic 1 weeks    ALE Lewis CNM 6/17/2020 11:08 AM

## 2020-06-24 ENCOUNTER — PRENATAL OFFICE VISIT (OUTPATIENT)
Dept: OBGYN | Facility: CLINIC | Age: 25
End: 2020-06-24
Payer: COMMERCIAL

## 2020-06-24 VITALS — SYSTOLIC BLOOD PRESSURE: 118 MMHG | DIASTOLIC BLOOD PRESSURE: 78 MMHG | WEIGHT: 242 LBS | BODY MASS INDEX: 37.98 KG/M2

## 2020-06-24 DIAGNOSIS — Z34.93 PRENATAL CARE IN THIRD TRIMESTER: Primary | ICD-10-CM

## 2020-06-24 PROCEDURE — 99207 ZZC PRENATAL VISIT: CPT | Performed by: ADVANCED PRACTICE MIDWIFE

## 2020-06-24 PROCEDURE — 59426 ANTEPARTUM CARE ONLY: CPT | Performed by: ADVANCED PRACTICE MIDWIFE

## 2020-06-24 NOTE — PROGRESS NOTES
Feeling well.  Baby is active. Denies any leaking of fluid, vaginal bleeding, regular uterine contractions, or headaches or other concerns.  Discussed pp meds to have.  She plans on staying with her mom after the baby is born for help and support.  Discussed postdates testing or induction at 41 weeks.  Reviewed to call for contractions, loss of fluid, vaginal bleeding, decreased fetal movement or any other questions or concerns.    RTC in 1 weeks.  Michelle Guevara, CLAUDIA, APRN, CNM

## 2020-06-24 NOTE — NURSING NOTE
"Chief Complaint   Patient presents with     Prenatal Care       Initial /78 (BP Location: Left arm, Cuff Size: Adult Regular)   Wt 109.8 kg (242 lb)   LMP 2019   BMI 37.98 kg/m   Estimated body mass index is 37.98 kg/m  as calculated from the following:    Height as of 19: 1.7 m (5' 6.93\").    Weight as of this encounter: 109.8 kg (242 lb).  BP completed using cuff size: regular    Questioned patient about current smoking habits.  Pt. Is still smoking, but has cut back.          Reports losing mucus plug, came out in little chunks.    Rick Morton, CMA           "

## 2020-06-25 ENCOUNTER — APPOINTMENT (OUTPATIENT)
Dept: ULTRASOUND IMAGING | Facility: CLINIC | Age: 25
End: 2020-06-25
Attending: ADVANCED PRACTICE MIDWIFE
Payer: COMMERCIAL

## 2020-06-25 ENCOUNTER — HOSPITAL ENCOUNTER (OUTPATIENT)
Facility: CLINIC | Age: 25
Discharge: HOME | End: 2020-06-25
Attending: ADVANCED PRACTICE MIDWIFE | Admitting: ADVANCED PRACTICE MIDWIFE
Payer: COMMERCIAL

## 2020-06-25 ENCOUNTER — TELEPHONE (OUTPATIENT)
Dept: OBGYN | Facility: CLINIC | Age: 25
End: 2020-06-25

## 2020-06-25 VITALS — DIASTOLIC BLOOD PRESSURE: 62 MMHG | TEMPERATURE: 98.1 F | RESPIRATION RATE: 18 BRPM | SYSTOLIC BLOOD PRESSURE: 135 MMHG

## 2020-06-25 DIAGNOSIS — Z36.89 ENCOUNTER FOR TRIAGE IN PREGNANT PATIENT: ICD-10-CM

## 2020-06-25 DIAGNOSIS — N30.01 ACUTE CYSTITIS WITH HEMATURIA: Primary | ICD-10-CM

## 2020-06-25 LAB
ALBUMIN UR-MCNC: NEGATIVE MG/DL
AMORPH CRY #/AREA URNS HPF: ABNORMAL /HPF
APPEARANCE UR: CLEAR
BILIRUB UR QL STRIP: NEGATIVE
COLOR UR AUTO: YELLOW
GLUCOSE UR STRIP-MCNC: NEGATIVE MG/DL
HGB UR QL STRIP: ABNORMAL
KETONES UR STRIP-MCNC: NEGATIVE MG/DL
LEUKOCYTE ESTERASE UR QL STRIP: ABNORMAL
NITRATE UR QL: NEGATIVE
PH UR STRIP: 7 PH (ref 5–7)
RBC #/AREA URNS AUTO: <1 /HPF (ref 0–2)
SOURCE: ABNORMAL
SP GR UR STRIP: 1.02 (ref 1–1.03)
SPECIMEN SOURCE: NORMAL
SQUAMOUS #/AREA URNS AUTO: 2 /HPF (ref 0–1)
UROBILINOGEN UR STRIP-MCNC: NORMAL MG/DL (ref 0–2)
WBC #/AREA URNS AUTO: 10 /HPF (ref 0–5)
WET PREP SPEC: NORMAL

## 2020-06-25 PROCEDURE — 59025 FETAL NON-STRESS TEST: CPT

## 2020-06-25 PROCEDURE — G0463 HOSPITAL OUTPT CLINIC VISIT: HCPCS | Mod: 25

## 2020-06-25 PROCEDURE — 87210 SMEAR WET MOUNT SALINE/INK: CPT | Performed by: ADVANCED PRACTICE MIDWIFE

## 2020-06-25 PROCEDURE — 59025 FETAL NON-STRESS TEST: CPT | Mod: 26 | Performed by: ADVANCED PRACTICE MIDWIFE

## 2020-06-25 PROCEDURE — 76815 OB US LIMITED FETUS(S): CPT

## 2020-06-25 PROCEDURE — 99213 OFFICE O/P EST LOW 20 MIN: CPT | Mod: 25 | Performed by: ADVANCED PRACTICE MIDWIFE

## 2020-06-25 PROCEDURE — 87086 URINE CULTURE/COLONY COUNT: CPT | Performed by: ADVANCED PRACTICE MIDWIFE

## 2020-06-25 PROCEDURE — 81001 URINALYSIS AUTO W/SCOPE: CPT | Performed by: ADVANCED PRACTICE MIDWIFE

## 2020-06-25 RX ORDER — NITROFURANTOIN 25; 75 MG/1; MG/1
100 CAPSULE ORAL 2 TIMES DAILY
Qty: 14 CAPSULE | Refills: 0 | Status: ON HOLD | OUTPATIENT
Start: 2020-06-25 | End: 2020-07-03

## 2020-06-25 NOTE — PLAN OF CARE
Data: Patient assessed in the [facility] for back pain, vaginal bleeding, and numbness in legs.  Cervical exam 1-2/30/-3.  Membranes intact.  Contractions occasional/irregular.  Action:  Presumed adequate fetal oxygenation documented (see flow record). Discharge instructions reviewed.  Patient instructed to report change in fetal movement, vaginal leaking of fluid or bleeding, abdominal pain, or any concerns related to the pregnancy to her nurse/physician.    Response: Orders to discharge home per Antonia Redmond.  Patient verbalized understanding of education and verbalized agreement with plan. Discharged to home at 1255.

## 2020-06-25 NOTE — DISCHARGE INSTRUCTIONS
Discharge Instruction for Undelivered Patients      You were seen for: Back pain, vaginal bleeding and numbness in legs  We Consulted: Rima LEONG  You had (Test or Medicine): uterine and fetal monitoring, wet prep, urine analysis, ultrasound, vaginal exam     Diet:   Drink 8 to 12 glasses of liquids (milk, juice, water) every day.  You may eat meals and snacks.     Activity:  Call your doctor or nurse midwife if your baby is moving less than usual.     Call your provider if you notice:  Swelling in your face or increased swelling in your hands or legs.  Headaches that are not relieved by Tylenol (acetaminophen).  Changes in your vision (blurring: seeing spots or stars.)  Nausea (sick to your stomach) and vomiting (throwing up).   Weight gain of 5 pounds or more per week.  Heartburn that doesn't go away.  Signs of bladder infection: pain when you urinate (use the toilet), need to go more often and more urgently.  The bag of yusuf (rupture of membranes) breaks, or you notice leaking in your underwear.  Bright red blood in your underwear.  Abdominal (lower belly) or stomach pain.  For first baby: Contractions (tightening) less than 5 minutes apart for one hour or more.  Second (plus) baby: Contractions (tightening) less than 10 minutes apart and getting stronger.  *If less than 34 weeks: Contractions (tightenings) more than 6 times in one hour.  Increase or change in vaginal discharge (note the color and amount)    Follow-up:  As scheduled in the clinic

## 2020-06-25 NOTE — PROVIDER NOTIFICATION
20 1125   Provider Notification   Provider Name/Title Rima GARCIA CNM   Method of Notification At Bedside   Data: Patient presented to [facility]: 2020 10:55 AM.  Reason for maternal/fetal assessment is back pain, vaginal bleeding, and numbness in legs. Patient reports intermittent, sharp back pain that started last night, currently rates it a 4/10 but states it was a 8/10 last night. Patient also reports bright red blood on toilet paper and in toilet since last night along with numbness in legs that was worse last night.  Patient is a .  Prenatal record reviewed. Pregnancy  has been complicated by tobacco use.  Gestational Age 39w0d. VSS. Fetal movement present. Patient denies uterine contractions, leaking of vaginal fluid/rupture of membranes, abdominal pain, nausea, vomiting, headache, epigastric or URQ pain, significant edema. Support person Elisabeth, her mother, is present.   Action: Verbal consent for EFM. Triage assessment completed. Bill of rights reviewed.  Response: Patient verbalized agreement with plan. Will contact Dr Antonia Redmond with update and further orders.    Rima LEONG at bedside. Sterile speculum exam performed, no bright red bleeding visualized. Orders for UA, wet prep, and ultrasound to rule out placenta previa as initial anatomy scan showed a low lying placenta (f/u scan at [facility] at 21 weeks showed placenta was not low lying).

## 2020-06-25 NOTE — TELEPHONE ENCOUNTER
"Patient calling.  Last night was taking a shower and had some   \"really bad\" lower back pain.  Having numbess in legs. \"feels like I have no legs\"  Noticed some bright red blood last night and a little this am.  No wearing a pad.  Baby active.  Can try heat, warm baths, Tylenol for discomfort, massage.    Called MW pager last night, but didn't know what to do.  Instructed on pager use.    Please advise.    Anastacia Purdy RN              "

## 2020-06-25 NOTE — PROVIDER NOTIFICATION
06/25/20 1245   Provider Notification   Provider Name/Title Rima GARCIA CNM   Method of Notification At Bedside   CNM at bedside to discuss wet prep, UA, and ultrasound sound results. CNM placing order for macrobid and order received to send urine to culture. SVE 1-2/30/-3. Order received to discharge patient and have her follow up in clinic next week as scheduled.

## 2020-06-25 NOTE — TELEPHONE ENCOUNTER
Please call patient and direct her to L&D to be triaged. Could be early labor/cervical change, but should r/o abruption/check NST.    Thanks!  Antonia Redmond, DNP, APRN, CNM

## 2020-06-25 NOTE — PROGRESS NOTES
[facility] CNM TRIAGE NOTE    Minoo William is a 24 year old  with and IUP at 39w0d who presents with complaint of bright red vaginal bleeding last night and this morning with wiping. Has not needed to wear a liner/pad. Denies vaginal intercourse in the last 24 hours or other leaking of fluid. Also endorses continuous low back pain, 8/10, with bilateral leg numbness last evening. Numbness resolved this morning; now rates back discomfort 4/10. Denies uterine cramping or contractions. Does report occasional one-sided leg numbness a few weeks ago. Patient states baby is active.    Present OB History at [facility] with the CNMs.    Problems this pregnancy:   1. Low-lying placenta on initial anatomy scan, found to be 4.06 cm from internal os at f/u scan with MFM at 21 weeks.    Patient Active Problem List   Diagnosis     Moderate single current episode of major depressive disorder (H)     Major depressive disorder, recurrent severe without psychotic features (H)     Pregnancy complicated by tobacco use in third trimester     High-risk pregnancy in third trimester     Encounter for triage in pregnant patient       ROS:  Patient is alert and oriented.  Denies headache, visual changes, SOB, chest pain, epigastric pain, N/V, or severe swelling of extremities. Denies dysuria, retention, or vaginal odor/irritation/itching.    PHYSICAL EXAM:  /62   Temp 98.1  F (36.7  C) (Oral)   Resp 18   LMP 2019     FHT's 135 with moderate variability  Accelerations: present   Decelerations:  absent        Contractions: Pt is delonte in an irregular pattern on tracing. Patient unaware/not feeling uterine activity.    Abdomen: gravid, nontender  Bloody show: no  Cervix:    - SSE: Difficult to visualize completely; os appears at least 1 cm dialted, cervix thick, no active bleeding noted   - SVE after ultrasound: 1.5/ 30%/ posterior/ average/ -3  Membranes are intact   Extremities: Mild  bilateral edema, reflexes normal    Results for orders placed or performed during the hospital encounter of 20   US OB >14 Weeks Limited wo Fetal Measurement    Impression    IMPRESSION:    1. Single live intrauterine pregnancy in cephalic presentation.  2. There is limited visualization of the placenta; however, there is  no convincing evidence for placenta previa.   3. Nonvisualization of the cervical length.       ASSESSMENT :   24 year old  with boyd IUP 39w0d not in labor  NST reactive  No previa  GBS negative and membranes intact  UA suggestive of UTI    PLAN:  Vaginal bleeding:   - Ultrasound to r/o previa given hx   - UA and wet prep   - Discussed differentials of abruption, previa, infection, and normal third trimester cervical change. Abruption unlikely due to reactive NST and absence of uterine tenderness/severe pain/nausea.    Low back pain/leg numbness:   - Consistent with common third trimester discomforts with mild sciatica component.   - Encouraged massage, heat packs, frequent position changes    UTI:  - Macrobid 100 mg bid x7 days pending culture  - Encouraged probiotic use in addition to antibiotics to prevent secondary yeast infection    Discharge home  Continue routine prenatal care; keep appointment as scheduled 20    Teaching done r/t to s/s of labor, SROM, decreased fetal movement, comfort measures in third trimester.  Instructed to please refer to the discharge handouts, the RN triage line or on-call CNM for any questions or concerns.  Pt verbalizes understanding and agreement with current plan of care.    Antonia Redmond, CLAUDIA, APRN, CNM

## 2020-06-26 LAB
BACTERIA SPEC CULT: NO GROWTH
Lab: NORMAL
SPECIMEN SOURCE: NORMAL

## 2020-06-30 ENCOUNTER — TELEPHONE (OUTPATIENT)
Dept: OBGYN | Facility: CLINIC | Age: 25
End: 2020-06-30

## 2020-06-30 ENCOUNTER — HOSPITAL ENCOUNTER (OUTPATIENT)
Facility: CLINIC | Age: 25
Discharge: HOME | End: 2020-06-30
Attending: ADVANCED PRACTICE MIDWIFE | Admitting: ADVANCED PRACTICE MIDWIFE
Payer: COMMERCIAL

## 2020-06-30 VITALS
HEIGHT: 67 IN | DIASTOLIC BLOOD PRESSURE: 78 MMHG | BODY MASS INDEX: 37.98 KG/M2 | WEIGHT: 242 LBS | TEMPERATURE: 98.9 F | RESPIRATION RATE: 18 BRPM | SYSTOLIC BLOOD PRESSURE: 124 MMHG

## 2020-06-30 LAB
ALBUMIN UR-MCNC: NEGATIVE MG/DL
APPEARANCE UR: CLEAR
BILIRUB UR QL STRIP: NEGATIVE
COLOR UR AUTO: ABNORMAL
GLUCOSE UR STRIP-MCNC: NEGATIVE MG/DL
HGB UR QL STRIP: NEGATIVE
KETONES UR STRIP-MCNC: NEGATIVE MG/DL
LEUKOCYTE ESTERASE UR QL STRIP: ABNORMAL
MUCOUS THREADS #/AREA URNS LPF: PRESENT /LPF
NITRATE UR QL: NEGATIVE
PH UR STRIP: 7 PH (ref 5–7)
RBC #/AREA URNS AUTO: 1 /HPF (ref 0–2)
SOURCE: ABNORMAL
SP GR UR STRIP: 1.01 (ref 1–1.03)
SQUAMOUS #/AREA URNS AUTO: 2 /HPF (ref 0–1)
TRANS CELLS #/AREA URNS HPF: <1 /HPF (ref 0–1)
UROBILINOGEN UR STRIP-MCNC: NORMAL MG/DL (ref 0–2)
WBC #/AREA URNS AUTO: 10 /HPF (ref 0–5)

## 2020-06-30 PROCEDURE — 25000132 ZZH RX MED GY IP 250 OP 250 PS 637: Performed by: ADVANCED PRACTICE MIDWIFE

## 2020-06-30 PROCEDURE — 96372 THER/PROPH/DIAG INJ SC/IM: CPT

## 2020-06-30 PROCEDURE — 99213 OFFICE O/P EST LOW 20 MIN: CPT | Mod: 25 | Performed by: ADVANCED PRACTICE MIDWIFE

## 2020-06-30 PROCEDURE — 81001 URINALYSIS AUTO W/SCOPE: CPT | Performed by: ADVANCED PRACTICE MIDWIFE

## 2020-06-30 PROCEDURE — 25000128 H RX IP 250 OP 636: Performed by: ADVANCED PRACTICE MIDWIFE

## 2020-06-30 PROCEDURE — 59025 FETAL NON-STRESS TEST: CPT | Mod: 26 | Performed by: ADVANCED PRACTICE MIDWIFE

## 2020-06-30 PROCEDURE — G0463 HOSPITAL OUTPT CLINIC VISIT: HCPCS

## 2020-06-30 RX ORDER — HYDROXYZINE HYDROCHLORIDE 50 MG/1
100 TABLET, FILM COATED ORAL ONCE
Status: COMPLETED | OUTPATIENT
Start: 2020-06-30 | End: 2020-06-30

## 2020-06-30 RX ORDER — MORPHINE SULFATE 10 MG/ML
10 INJECTION, SOLUTION INTRAMUSCULAR; INTRAVENOUS ONCE
Status: COMPLETED | OUTPATIENT
Start: 2020-06-30 | End: 2020-06-30

## 2020-06-30 RX ADMIN — HYDROXYZINE HYDROCHLORIDE 100 MG: 50 TABLET, FILM COATED ORAL at 19:28

## 2020-06-30 RX ADMIN — MORPHINE SULFATE 10 MG: 10 INJECTION INTRAVENOUS at 19:28

## 2020-06-30 ASSESSMENT — ACTIVITIES OF DAILY LIVING (ADL)
FALL_HISTORY_WITHIN_LAST_SIX_MONTHS: NO
RETIRED_EATING: 0-->INDEPENDENT
RETIRED_COMMUNICATION: 0-->UNDERSTANDS/COMMUNICATES WITHOUT DIFFICULTY
TOILETING: 0-->INDEPENDENT
SWALLOWING: 0-->SWALLOWS FOODS/LIQUIDS WITHOUT DIFFICULTY
DRESS: 0-->INDEPENDENT
AMBULATION: 0-->INDEPENDENT
TRANSFERRING: 0-->INDEPENDENT
BATHING: 0-->INDEPENDENT
COGNITION: 0 - NO COGNITION ISSUES REPORTED

## 2020-06-30 ASSESSMENT — MIFFLIN-ST. JEOR: SCORE: 1885.33

## 2020-06-30 NOTE — TELEPHONE ENCOUNTER
Patient paged to report contractions since 5:00 this morning. She was able to fall back asleep for some time. Have been continuous ever since waking for the day. Baby active. Denies vaginal bleeding or LOF. Has been staying hydrated and active. Contractions now every 5 minutes apart, feeling about the same as they did at onset. Coping and talking well through them. Encouraged her to continue staying as active and upright as possible and drinking water; may soak in the tub for comfort. Instructed to call back when contractions are 4-5 minutes apart and progressively and consistently more uncomfortable. Pt verbalizes understanding and agrees with plan.    Antonia Redmond, DNP, APRN, CNM

## 2020-06-30 NOTE — PROVIDER NOTIFICATION
20 1750   Provider Notification   Provider Name/Title Carolyn Subramanian CNM   Method of Notification At Bedside   Data: Patient presented to [facility]: 2020  5:38 PM.  Reason for maternal/fetal assessment is uterine contractions. Patient reports contractions beginning at 0500 that started getting stronger a few hours ago and are approximately 5 minutes apart.  Patient is a .  Prenatal record reviewed. Pregnancy has been complicated by tobacco use.  Gestational Age 39w5d. VSS. Fetal movement present. Patient denies leaking of vaginal fluid/rupture of membranes, vaginal bleeding, nausea, vomiting, headache, visual disturbances, epigastric or URQ pain, significant edema. Support person Elisabeth, mother, is present.   Action: Verbal consent for EFM. Triage assessment completed. Bill of rights reviewed.  Response: Patient verbalized agreement with plan. Will contact Dr Carolyn Subramanian with update and further orders.    Carolyn Subramanian CNM at bedside upon patient arrival. SVE /-1. Patient currently taking macrobid, order to send urine for UA. Plan for repeat SVE in an hour. Okay to take off monitors and have patient ambulate in room after reactive NST. Patient declines birthing ball.

## 2020-06-30 NOTE — PROVIDER NOTIFICATION
06/30/20 6643   Provider Notification   Provider Name/Title Carolyn Subramanian CNM   Method of Notification At Bedside   CNM at bedside. Herbert every 2-6 minutes, patient states they feel the same intensity. SVE unchanged. FHT's category one with accelerations. CNM placing orders for morphine injection 10 mg IM and hydroxyzine tablet 100 mg PO to give prior to patient discharging. Order received to discharge patient.

## 2020-06-30 NOTE — DISCHARGE INSTRUCTIONS
Discharge Instruction for Undelivered Patients      You were seen for: Labor Assessment  We Consulted: Carolyn Subramanian CNM  You had (Test or Medicine): uterine and fetal monitoring, urine analysis, and vaginal exam     Diet:   Drink 8 to 12 glasses of liquids (milk, juice, water) every day.  You may eat meals and snacks.     Activity:  Call your doctor or nurse midwife if your baby is moving less than usual.     Call your provider if you notice:  Swelling in your face or increased swelling in your hands or legs.  Headaches that are not relieved by Tylenol (acetaminophen).  Changes in your vision (blurring: seeing spots or stars.)  Nausea (sick to your stomach) and vomiting (throwing up).   Weight gain of 5 pounds or more per week.  Heartburn that doesn't go away.  Signs of bladder infection: pain when you urinate (use the toilet), need to go more often and more urgently.  The bag of yusuf (rupture of membranes) breaks, or you notice leaking in your underwear.  Bright red blood in your underwear.  Abdominal (lower belly) or stomach pain.  For first baby: Contractions (tightening) less than 5 minutes apart for one hour or more.  Second (plus) baby: Contractions (tightening) less than 10 minutes apart and getting stronger.  *If less than 34 weeks: Contractions (tightenings) more than 6 times in one hour.  Increase or change in vaginal discharge (note the color and amount)    Follow-up:  As scheduled in the clinic

## 2020-06-30 NOTE — PROGRESS NOTES
"[facility] CNM TRIAGE NOTE    Minoo William is a 24 year old  with and IUP at 39w5d who presents with complaint of contractions starting at 0500 this   AM. Becoming progressively closer and stronger in the past 2 hours    Patient states baby is active.  Denies ROM   Denies vaginal bleeding  Present OB History at [facility] with the CNMs.     Problems this pregnancy:   1. Smoker, 3-5 cigarettes/day    ROS:  Patient is alert and oriented      PHYSICAL EXAM:  /78   Temp 98.9  F (37.2  C) (Oral)   Resp 18   Ht 1.702 m (5' 7\")   Wt 109.8 kg (242 lb)   LMP 2019   BMI 37.90 kg/m      FHT's 145 with moderate variability  Accelerations: present   Decelerations:  absent        Contractions: Pt is delonte every 4-6 minutes, lasting 45-60 seconds and palpates moderate initially. Spaced out after one hour of observation    Abdomen: gravid, non-tender  Bloody show: no  Cervix: 2/ 90% / Posterior/ soft/ -1  Recheck of cervix 2/90/posterior/soft/-2 no change  Membranes are intact       ASSESSMENT :   24 year old  with boyd IUP 39w5d for observation.   Early labor vs prodromal labor  NST  reactive  GBS negative and membranes intact         PLAN:  Morphine 10mg IM and  Vistaril 100mg PO now for therapeutic rest.    Advised patient to call if contractions become closer    Discharge home  Continue routine prenatal care      Teaching done r/t to s/s of labor, SROM, decreased fetal movement, comfort measures in third trimester.  Instructed to please refer to the discharge handouts, the RN triage line or on-call CNM for any questions or concerns.  Pt verbalizes understanding and agreement with current plan of care.    CAMRON ORLANDO CNM        "

## 2020-07-01 ENCOUNTER — HOSPITAL ENCOUNTER (INPATIENT)
Facility: CLINIC | Age: 25
LOS: 2 days | Discharge: HOME | End: 2020-07-03
Attending: ADVANCED PRACTICE MIDWIFE | Admitting: ADVANCED PRACTICE MIDWIFE
Payer: COMMERCIAL

## 2020-07-01 ENCOUNTER — ANESTHESIA (OUTPATIENT)
Dept: OBGYN | Facility: CLINIC | Age: 25
End: 2020-07-01
Payer: COMMERCIAL

## 2020-07-01 ENCOUNTER — PRENATAL OFFICE VISIT (OUTPATIENT)
Dept: OBGYN | Facility: CLINIC | Age: 25
End: 2020-07-01
Payer: COMMERCIAL

## 2020-07-01 ENCOUNTER — ANESTHESIA EVENT (OUTPATIENT)
Dept: OBGYN | Facility: CLINIC | Age: 25
End: 2020-07-01
Payer: COMMERCIAL

## 2020-07-01 VITALS — BODY MASS INDEX: 38.44 KG/M2 | WEIGHT: 245.4 LBS | SYSTOLIC BLOOD PRESSURE: 120 MMHG | DIASTOLIC BLOOD PRESSURE: 62 MMHG

## 2020-07-01 DIAGNOSIS — Z34.03 SUPERVISION OF LOW-RISK FIRST PREGNANCY, THIRD TRIMESTER: Primary | ICD-10-CM

## 2020-07-01 LAB
ABO + RH BLD: NORMAL
ABO + RH BLD: NORMAL
AMPHETAMINES UR QL SCN: NEGATIVE
CANNABINOIDS UR QL: NEGATIVE
COCAINE UR QL: NEGATIVE
OPIATES UR QL SCN: ABNORMAL
PCP UR QL SCN: NEGATIVE
SARS-COV-2 PCR COMMENT: NORMAL
SARS-COV-2 RNA SPEC QL NAA+PROBE: NEGATIVE
SARS-COV-2 RNA SPEC QL NAA+PROBE: NORMAL
SPECIMEN EXP DATE BLD: NORMAL
SPECIMEN SOURCE: NORMAL
SPECIMEN SOURCE: NORMAL

## 2020-07-01 PROCEDURE — 86901 BLOOD TYPING SEROLOGIC RH(D): CPT | Performed by: ADVANCED PRACTICE MIDWIFE

## 2020-07-01 PROCEDURE — 40000671 ZZH STATISTIC ANESTHESIA CASE

## 2020-07-01 PROCEDURE — 59025 FETAL NON-STRESS TEST: CPT | Performed by: ADVANCED PRACTICE MIDWIFE

## 2020-07-01 PROCEDURE — 25800030 ZZH RX IP 258 OP 636: Performed by: ADVANCED PRACTICE MIDWIFE

## 2020-07-01 PROCEDURE — 72200001 ZZH LABOR CARE VAGINAL DELIVERY SINGLE

## 2020-07-01 PROCEDURE — 37000011 ZZH ANESTHESIA WARD SERVICE

## 2020-07-01 PROCEDURE — 25000128 H RX IP 250 OP 636

## 2020-07-01 PROCEDURE — 25000125 ZZHC RX 250: Performed by: ADVANCED PRACTICE MIDWIFE

## 2020-07-01 PROCEDURE — 99207 ZZC PRENATAL VISIT: CPT | Performed by: ADVANCED PRACTICE MIDWIFE

## 2020-07-01 PROCEDURE — 86900 BLOOD TYPING SEROLOGIC ABO: CPT | Performed by: ADVANCED PRACTICE MIDWIFE

## 2020-07-01 PROCEDURE — 99221 1ST HOSP IP/OBS SF/LOW 40: CPT | Performed by: ADVANCED PRACTICE MIDWIFE

## 2020-07-01 PROCEDURE — 80361 OPIATES 1 OR MORE: CPT | Performed by: ADVANCED PRACTICE MIDWIFE

## 2020-07-01 PROCEDURE — 12000000 ZZH R&B MED SURG/OB

## 2020-07-01 PROCEDURE — 86780 TREPONEMA PALLIDUM: CPT | Performed by: ADVANCED PRACTICE MIDWIFE

## 2020-07-01 PROCEDURE — 10907ZC DRAINAGE OF AMNIOTIC FLUID, THERAPEUTIC FROM PRODUCTS OF CONCEPTION, VIA NATURAL OR ARTIFICIAL OPENING: ICD-10-PCS | Performed by: ADVANCED PRACTICE MIDWIFE

## 2020-07-01 PROCEDURE — 25000132 ZZH RX MED GY IP 250 OP 250 PS 637: Performed by: ADVANCED PRACTICE MIDWIFE

## 2020-07-01 PROCEDURE — 80307 DRUG TEST PRSMV CHEM ANLYZR: CPT | Performed by: ADVANCED PRACTICE MIDWIFE

## 2020-07-01 PROCEDURE — U0003 INFECTIOUS AGENT DETECTION BY NUCLEIC ACID (DNA OR RNA); SEVERE ACUTE RESPIRATORY SYNDROME CORONAVIRUS 2 (SARS-COV-2) (CORONAVIRUS DISEASE [COVID-19]), AMPLIFIED PROBE TECHNIQUE, MAKING USE OF HIGH THROUGHPUT TECHNOLOGIES AS DESCRIBED BY CMS-2020-01-R: HCPCS | Performed by: ADVANCED PRACTICE MIDWIFE

## 2020-07-01 PROCEDURE — 25000128 H RX IP 250 OP 636: Performed by: ADVANCED PRACTICE MIDWIFE

## 2020-07-01 RX ORDER — LIDOCAINE 40 MG/G
CREAM TOPICAL
Status: DISCONTINUED | OUTPATIENT
Start: 2020-07-01 | End: 2020-07-01

## 2020-07-01 RX ORDER — AMOXICILLIN 250 MG
1 CAPSULE ORAL 2 TIMES DAILY
Status: DISCONTINUED | OUTPATIENT
Start: 2020-07-01 | End: 2020-07-03 | Stop reason: HOSPADM

## 2020-07-01 RX ORDER — OXYTOCIN 10 [USP'U]/ML
10 INJECTION, SOLUTION INTRAMUSCULAR; INTRAVENOUS ONCE
Status: DISCONTINUED | OUTPATIENT
Start: 2020-07-01 | End: 2020-07-01 | Stop reason: CLARIF

## 2020-07-01 RX ORDER — NALOXONE HYDROCHLORIDE 0.4 MG/ML
.1-.4 INJECTION, SOLUTION INTRAMUSCULAR; INTRAVENOUS; SUBCUTANEOUS
Status: DISCONTINUED | OUTPATIENT
Start: 2020-07-01 | End: 2020-07-01

## 2020-07-01 RX ORDER — NALBUPHINE HYDROCHLORIDE 20 MG/ML
2.5-5 INJECTION, SOLUTION INTRAMUSCULAR; INTRAVENOUS; SUBCUTANEOUS EVERY 6 HOURS PRN
Status: DISCONTINUED | OUTPATIENT
Start: 2020-07-01 | End: 2020-07-01

## 2020-07-01 RX ORDER — TRANEXAMIC ACID 10 MG/ML
1 INJECTION, SOLUTION INTRAVENOUS EVERY 30 MIN PRN
Status: DISCONTINUED | OUTPATIENT
Start: 2020-07-01 | End: 2020-07-03 | Stop reason: HOSPADM

## 2020-07-01 RX ORDER — OXYTOCIN/0.9 % SODIUM CHLORIDE 30/500 ML
1-24 PLASTIC BAG, INJECTION (ML) INTRAVENOUS CONTINUOUS
Status: DISCONTINUED | OUTPATIENT
Start: 2020-07-01 | End: 2020-07-01

## 2020-07-01 RX ORDER — CARBOPROST TROMETHAMINE 250 UG/ML
250 INJECTION, SOLUTION INTRAMUSCULAR
Status: DISCONTINUED | OUTPATIENT
Start: 2020-07-01 | End: 2020-07-01

## 2020-07-01 RX ORDER — IBUPROFEN 800 MG/1
800 TABLET, FILM COATED ORAL
Status: COMPLETED | OUTPATIENT
Start: 2020-07-01 | End: 2020-07-01

## 2020-07-01 RX ORDER — METHYLERGONOVINE MALEATE 0.2 MG/ML
200 INJECTION INTRAVENOUS
Status: DISCONTINUED | OUTPATIENT
Start: 2020-07-01 | End: 2020-07-01

## 2020-07-01 RX ORDER — OXYTOCIN 10 [USP'U]/ML
10 INJECTION, SOLUTION INTRAMUSCULAR; INTRAVENOUS
Status: DISCONTINUED | OUTPATIENT
Start: 2020-07-01 | End: 2020-07-03 | Stop reason: HOSPADM

## 2020-07-01 RX ORDER — BISACODYL 10 MG
10 SUPPOSITORY, RECTAL RECTAL DAILY PRN
Status: DISCONTINUED | OUTPATIENT
Start: 2020-07-03 | End: 2020-07-03 | Stop reason: HOSPADM

## 2020-07-01 RX ORDER — ONDANSETRON 2 MG/ML
4 INJECTION INTRAMUSCULAR; INTRAVENOUS EVERY 6 HOURS PRN
Status: DISCONTINUED | OUTPATIENT
Start: 2020-07-01 | End: 2020-07-01

## 2020-07-01 RX ORDER — EPHEDRINE SULFATE 50 MG/ML
INJECTION, SOLUTION INTRAMUSCULAR; INTRAVENOUS; SUBCUTANEOUS
Status: DISCONTINUED
Start: 2020-07-01 | End: 2020-07-01 | Stop reason: WASHOUT

## 2020-07-01 RX ORDER — IBUPROFEN 800 MG/1
800 TABLET, FILM COATED ORAL EVERY 6 HOURS PRN
Status: DISCONTINUED | OUTPATIENT
Start: 2020-07-01 | End: 2020-07-03 | Stop reason: HOSPADM

## 2020-07-01 RX ORDER — ACETAMINOPHEN 325 MG/1
650 TABLET ORAL EVERY 4 HOURS PRN
Status: DISCONTINUED | OUTPATIENT
Start: 2020-07-01 | End: 2020-07-01

## 2020-07-01 RX ORDER — OXYTOCIN/0.9 % SODIUM CHLORIDE 30/500 ML
100 PLASTIC BAG, INJECTION (ML) INTRAVENOUS CONTINUOUS
Status: DISCONTINUED | OUTPATIENT
Start: 2020-07-01 | End: 2020-07-03 | Stop reason: HOSPADM

## 2020-07-01 RX ORDER — MODIFIED LANOLIN
OINTMENT (GRAM) TOPICAL
Status: DISCONTINUED | OUTPATIENT
Start: 2020-07-01 | End: 2020-07-03 | Stop reason: HOSPADM

## 2020-07-01 RX ORDER — OXYTOCIN 10 [USP'U]/ML
10 INJECTION, SOLUTION INTRAMUSCULAR; INTRAVENOUS
Status: COMPLETED | OUTPATIENT
Start: 2020-07-01 | End: 2020-07-01

## 2020-07-01 RX ORDER — OXYTOCIN 10 [USP'U]/ML
INJECTION, SOLUTION INTRAMUSCULAR; INTRAVENOUS
Status: COMPLETED
Start: 2020-07-01 | End: 2020-07-01

## 2020-07-01 RX ORDER — EPHEDRINE SULFATE 50 MG/ML
5 INJECTION, SOLUTION INTRAMUSCULAR; INTRAVENOUS; SUBCUTANEOUS
Status: DISCONTINUED | OUTPATIENT
Start: 2020-07-01 | End: 2020-07-01

## 2020-07-01 RX ORDER — OXYCODONE AND ACETAMINOPHEN 5; 325 MG/1; MG/1
1 TABLET ORAL
Status: DISCONTINUED | OUTPATIENT
Start: 2020-07-01 | End: 2020-07-01

## 2020-07-01 RX ORDER — ACETAMINOPHEN 325 MG/1
650 TABLET ORAL EVERY 4 HOURS PRN
Status: DISCONTINUED | OUTPATIENT
Start: 2020-07-01 | End: 2020-07-03 | Stop reason: HOSPADM

## 2020-07-01 RX ORDER — TRANEXAMIC ACID 10 MG/ML
1 INJECTION, SOLUTION INTRAVENOUS EVERY 30 MIN PRN
Status: DISCONTINUED | OUTPATIENT
Start: 2020-07-01 | End: 2020-07-01

## 2020-07-01 RX ORDER — AMOXICILLIN 250 MG
2 CAPSULE ORAL 2 TIMES DAILY
Status: DISCONTINUED | OUTPATIENT
Start: 2020-07-01 | End: 2020-07-03 | Stop reason: HOSPADM

## 2020-07-01 RX ORDER — HYDROCORTISONE 2.5 %
CREAM (GRAM) TOPICAL 3 TIMES DAILY PRN
Status: DISCONTINUED | OUTPATIENT
Start: 2020-07-01 | End: 2020-07-03 | Stop reason: HOSPADM

## 2020-07-01 RX ORDER — NALOXONE HYDROCHLORIDE 0.4 MG/ML
.1-.4 INJECTION, SOLUTION INTRAMUSCULAR; INTRAVENOUS; SUBCUTANEOUS
Status: DISCONTINUED | OUTPATIENT
Start: 2020-07-01 | End: 2020-07-03 | Stop reason: HOSPADM

## 2020-07-01 RX ORDER — FENTANYL CITRATE 50 UG/ML
50-100 INJECTION, SOLUTION INTRAMUSCULAR; INTRAVENOUS
Status: DISCONTINUED | OUTPATIENT
Start: 2020-07-01 | End: 2020-07-01

## 2020-07-01 RX ORDER — OXYTOCIN/0.9 % SODIUM CHLORIDE 30/500 ML
100-340 PLASTIC BAG, INJECTION (ML) INTRAVENOUS CONTINUOUS PRN
Status: DISCONTINUED | OUTPATIENT
Start: 2020-07-01 | End: 2020-07-01

## 2020-07-01 RX ORDER — SODIUM CHLORIDE, SODIUM LACTATE, POTASSIUM CHLORIDE, CALCIUM CHLORIDE 600; 310; 30; 20 MG/100ML; MG/100ML; MG/100ML; MG/100ML
INJECTION, SOLUTION INTRAVENOUS CONTINUOUS
Status: DISCONTINUED | OUTPATIENT
Start: 2020-07-01 | End: 2020-07-01

## 2020-07-01 RX ORDER — OXYTOCIN/0.9 % SODIUM CHLORIDE 30/500 ML
340 PLASTIC BAG, INJECTION (ML) INTRAVENOUS CONTINUOUS PRN
Status: DISCONTINUED | OUTPATIENT
Start: 2020-07-01 | End: 2020-07-03 | Stop reason: HOSPADM

## 2020-07-01 RX ORDER — FENTANYL/BUPIVACAINE/NS/PF 2-1250MCG
PLASTIC BAG, INJECTION (ML) INJECTION
Status: COMPLETED
Start: 2020-07-01 | End: 2020-07-01

## 2020-07-01 RX ADMIN — IBUPROFEN 800 MG: 800 TABLET ORAL at 19:59

## 2020-07-01 RX ADMIN — SODIUM CHLORIDE, POTASSIUM CHLORIDE, SODIUM LACTATE AND CALCIUM CHLORIDE: 600; 310; 30; 20 INJECTION, SOLUTION INTRAVENOUS at 15:47

## 2020-07-01 RX ADMIN — OXYTOCIN 20 UNITS: 10 INJECTION, SOLUTION INTRAMUSCULAR; INTRAVENOUS at 19:04

## 2020-07-01 RX ADMIN — SODIUM CHLORIDE, POTASSIUM CHLORIDE, SODIUM LACTATE AND CALCIUM CHLORIDE: 600; 310; 30; 20 INJECTION, SOLUTION INTRAVENOUS at 12:15

## 2020-07-01 RX ADMIN — DOCUSATE SODIUM AND SENNOSIDES 1 TABLET: 8.6; 5 TABLET ORAL at 21:55

## 2020-07-01 RX ADMIN — ACETAMINOPHEN 650 MG: 325 TABLET, FILM COATED ORAL at 22:44

## 2020-07-01 RX ADMIN — Medication 1 MILLI-UNITS/MIN: at 18:26

## 2020-07-01 RX ADMIN — Medication 10 ML/HR: at 15:54

## 2020-07-01 RX ADMIN — FENTANYL CITRATE 100 MCG: 0.05 INJECTION, SOLUTION INTRAMUSCULAR; INTRAVENOUS at 12:15

## 2020-07-01 RX ADMIN — FENTANYL CITRATE 100 MCG: 0.05 INJECTION, SOLUTION INTRAMUSCULAR; INTRAVENOUS at 14:33

## 2020-07-01 ASSESSMENT — MIFFLIN-ST. JEOR: SCORE: 1898.94

## 2020-07-01 NOTE — PROVIDER NOTIFICATION
20 1215   Provider Notification   Provider Name/Title SALO Rosales   Method of Notification At Bedside   Request Evaluate in Person   Notification Reason Labor Status;Status Update    here at 39w6d for labor.  Pt was seen here in l&d last night and was sent home and was seen in the clinic today and was /- and was sent over to labor and delivery.  Efm applied x2 and explained.  Pt is uncomfortable.  No bleeding or leaking of fluid.  Pt is feeling the baby move.  SVE 5-/-1

## 2020-07-01 NOTE — H&P
SALO Labor Admission History & Physical    Minoo William is a 24 year old  with an IUP at 39w6d  ; partnered,   Partner/support Person: Mom  Language Barrier: English  Clinic: [facility]   Provider: Keyshawn    Minoo William is admitted to the Birth[facility] at [facility] on 2020 at 12:17 PM    Problems during pregnancy:  -Smoker 3-5 cigarettes/day  -Low lying placenta, resolved  -Obesity        History of present inllness/Chief Complaint:    Here with: spontaneous onset of labor  Patient reports contractions are Irregular every 5-6 minutes  Baby active Yes  Membranes are bulging  Bloody show No   Any changes with medical history since last prenatal visit No  Declines syphilis screening.  no    Obstetrical history  Estimated Date of Delivery: 2020 determined by ultrasound, 1w3d discrepancy with LMP.   Patient's last menstrual period was 2019.   Dating U/S: 19    Fetal anatomic survey: Normal  Placenta: initially low lying- resolved on most recent ultrasound     PRENATAL COURSE  Prenatal care began at 9 wks gestation for a total of 9 prenatal visits.  Total wt gain 12lbs; Body mass index is 38.37 kg/m .  Prenatal Blood Pressure: WNL  Prenatal course was   complicated by cigarette smoking. 3-5 cigarettes/day    Patient Active Problem List    Diagnosis Date Noted     Indication for care in labor or delivery 2020     Priority: Medium     Encounter for triage in pregnant patient 2020     Priority: Medium     Pregnancy complicated by tobacco use in third trimester 2020     Priority: Medium     High-risk pregnancy in third trimester 2020     Priority: Medium     Moderate single current episode of major depressive disorder (H) 2017     Priority: Medium     Major depressive disorder, recurrent severe without psychotic features (H) 2017     Priority: Medium     Tdap: given  Rhogam: n/a    Patient Active Problem List   Diagnosis     Moderate  "single current episode of major depressive disorder (H)     Major depressive disorder, recurrent severe without psychotic features (H)     Pregnancy complicated by tobacco use in third trimester     High-risk pregnancy in third trimester     Encounter for triage in pregnant patient     Indication for care in labor or delivery       HISTORY  No Known Allergies  Past Medical History:   Diagnosis Date     Depressive disorder     in the past, no current meds or treatment     Past Surgical History:   Procedure Laterality Date     EXTRACTION(S) DENTAL       Family History   Problem Relation Age of Onset     Hypertension Mother      Breast Cancer Maternal Grandmother      Social History     Tobacco Use     Smoking status: Current Every Day Smoker     Packs/day: 0.25     Smokeless tobacco: Never Used   Substance Use Topics     Alcohol use: No     OB History    Para Term  AB Living   1 0 0 0 0 0   SAB TAB Ectopic Multiple Live Births   0 0 0 0 0      # Outcome Date GA Lbr Dalton/2nd Weight Sex Delivery Anes PTL Lv   1 Current                LABS:  Lab Results   Component Value Date    ABO B 2019    RH Pos 2019    AS negative 2019    HGB 11.4 (L) 2020    HEPBANG non-reactive 2019    CHPCRT Negative 2020    GCPCRT Negative 2020    RUBELLAABIGG immune 2019       GBS Status:   Lab Results   Component Value Date    GBS Negative 2020     Rubella: Immune    HIV: Non-Reactive   Platelets:  285  1hr GCT:  105    ROS   Pt is alert and oriented  Pt denies significant constitutional symptoms including fever and/or malaise.    Pt denies significant respiratory, cardiovacular, GI, or muscular/skeletal complaints.    Neuro: Denies HA and visual changes  Muscoloskeletal: Denies except for discomforts r/t pregnancy     PHYSICAL EXAM:  /78   Pulse 113   Temp 98.3  F (36.8  C) (Oral)   Resp 18   Ht 1.702 m (5' 7\")   Wt 111.1 kg (245 lb)   LMP 2019   BMI 38.37 " kg/m    General appearance:  healthy, alert and active   Lungs: normal respiratory effort  Abdomen: gravid, single vertex fetus, non-tender, EFW 7 lbs.   Legs: reflexes 2+ bilaterally, no clonus, no edema     Contractions: Pt is delonte every 5-6 minutes, lasting 60 seconds and palpates moderate    Fetal heart tones: Baseline 135   Variability: moderate   Accelerations: present  Decelerations: absent    NST: reactive    Cervix: 5-6/ 90% /  -1, Vtx per RN exam (5/100/0) per exam in the office   Bloody show: no  Membranes:  intact    ASSESSMENT:  24 year old  with boyd IUP 39w6d in early labor, excellent change from exam yesterday  NST reactive  GBS negative and membranes intact    PLAN:  Routine CNM care  Labs ordered: Hemoglobin and type and screen  Teaching done r/t comfort measures, pain management options, and labor processes  Admit - see IP orders    ALE Lewis CNM 2020 12:31 PM

## 2020-07-01 NOTE — ANESTHESIA PREPROCEDURE EVALUATION
"Anesthesia Pre-Procedure Evaluation    Patient: Minoo William   MRN: [id number] : 1995          Preoperative Diagnosis: * No surgery found *        Past Medical History:   Diagnosis Date     Depressive disorder     in the past, no current meds or treatment     Past Surgical History:   Procedure Laterality Date     EXTRACTION(S) DENTAL       Anesthesia Evaluation       history and physical reviewed .             ROS/MED HX    ENT/Pulmonary:  - neg pulmonary ROS     Neurologic:       Cardiovascular:  - neg cardiovascular ROS       METS/Exercise Tolerance:     Hematologic:         Musculoskeletal:         GI/Hepatic:         Renal/Genitourinary:         Endo:         Psychiatric:         Infectious Disease:         Malignancy:         Other:                     neg OB ROS            Physical Exam      Airway   Mallampati: III  TM distance: > 3 FB  Neck ROM: full    Dental     Cardiovascular       Pulmonary             Lab Results   Component Value Date    WBC 15.2 (H) 2020    HGB 11.4 (L) 2020    HCT 35.2 2020     2020     2017    POTASSIUM 3.5 2017    CHLORIDE 109 2017    CO2 25 2017    BUN 13 2017    CR 0.75 2017    GLC 92 2017    ERENDIRA 9.0 2017    ALBUMIN 3.9 2016    PROTTOTAL 7.4 2016    ALT 29 2016    AST 16 2016    ALKPHOS 75 2016    BILITOTAL 0.2 2016    TSH 1.95 2017       Preop Vitals  BP Readings from Last 3 Encounters:   20 135/78   20 120/62   20 124/78    Pulse Readings from Last 3 Encounters:   20 113   17 77   17 76      Resp Readings from Last 3 Encounters:   20 18   20 18   20 18    SpO2 Readings from Last 3 Encounters:   17 97%   17 96%   16 98%      Temp Readings from Last 1 Encounters:   20 98.3  F (36.8  C) (Oral)    Ht Readings from Last 1 Encounters:   20 1.702 m (5' 7\")    " "  Wt Readings from Last 1 Encounters:   07/01/20 111.1 kg (245 lb)    Estimated body mass index is 38.37 kg/m  as calculated from the following:    Height as of this encounter: 1.702 m (5' 7\").    Weight as of this encounter: 111.1 kg (245 lb).       Anesthesia Plan      History & Physical Review      ASA Status:  2 .             Postoperative Care      Consents  Anesthetic plan, risks, benefits and alternatives discussed with:  Patient..                 Isrrael Eng MD                    .  "

## 2020-07-01 NOTE — NURSING NOTE
"Chief Complaint   Patient presents with     Prenatal Care     39 weeks 6 days, patient has been having contractions 5-8 minutes apart all morning. She was evaluated in L&D yesterday for contractions. No vaginal bleeding or leaking of fluids. Feeling movement, but it has decreased this morning       Initial /62   Wt 111.3 kg (245 lb 6.4 oz)   LMP 2019   BMI 38.44 kg/m   Estimated body mass index is 38.44 kg/m  as calculated from the following:    Height as of 20: 1.702 m (5' 7\").    Weight as of this encounter: 111.3 kg (245 lb 6.4 oz).  BP completed using cuff size: regular    Questioned patient about current smoking habits.  Pt. has never smoked.          The following HM Due: NONE      Sosa Arvizu CMA             "

## 2020-07-01 NOTE — PROGRESS NOTES
"CNM PROGRESS NOTE    SUBJECTIVE:  CTSP to evaluate tracing. Pt noted to be having variable declarations with contractions. Having a lot of rectal pressure     OBJECTIVE:  /62   Pulse 113   Temp 98.4  F (36.9  C)   Resp 18   Ht 1.702 m (5' 7\")   Wt 111.1 kg (245 lb)   LMP 09/17/2019   SpO2 98%   BMI 38.37 kg/m      Fetal heart tones: Baseline 130   Variability: moderate  Accelerations: present  Decelerations: present, 3 variable decelerations noted, good return to baseline     Contractions: Pt is delonte every 2-3 minutes, lasting 60 seconds and palpates moderate    Cervix: 9/ 100% / 0, Vtx  ROM: clear fluid    Pitocin- none  Antibiotics- none  Cervical ripening: N/A    ASSESSMENT:  IUP @ 39w6d active labor   GBS- negative  FHR category II, overall reassuring due to moderate variability and spontaneous accelerations     PLAN:   Continue to watch FHR closely  Reassess short interval or PRN     ALE Lewis CNM 7/1/2020 5:17 PM      "

## 2020-07-01 NOTE — PROVIDER NOTIFICATION
07/01/20 1852   Provider Notification   Provider Name/Title Dr. Ambrose   Method of Notification At Bedside   Request Evaluate in Person   Notification Reason Decels   Dr. Ambrose at bedside to help for AVD with Kiwi

## 2020-07-01 NOTE — PROGRESS NOTES
"SALO PROGRESS NOTE    SUBJECTIVE:  Minoo is coping well with contractions. She has gotten good relief from Fentanyl. R/B/A of AROM discussed with patient and partner, pt on board with having this done for labor augmentation. Requested epidural for pain control prior to AROM. Mom and partner Melvin at bedside and supportive.    OBJECTIVE:  /56   Pulse 113   Temp 98.4  F (36.9  C)   Resp 18   Ht 1.702 m (5' 7\")   Wt 111.1 kg (245 lb)   LMP 09/17/2019   SpO2 98%   BMI 38.37 kg/m      Fetal heart tones: Baseline 130s   Variability: moderate  Accelerations: present  Decelerations: absent    Contractions: Pt is delonte every 5-6 minutes, lasting 60-90 seconds and palpates moderate    Cervix: 7-8/ 100% / -1, Vtx  ROM: AROM for moderate amount of clear fluid    Pitocin- none  Antibiotics- none  Cervical ripening: N/A    ASSESSMENT:  IUP @ 39w6d active labor   GBS- negative  FHR Category I     PLAN:   Good cervical change   Continue to encourage position changes to facilitate fetal descent   Reevaluate in 2-4 hours/PRN    ALE Lewis CNM 7/1/2020 4:34 PM        "

## 2020-07-01 NOTE — PROVIDER NOTIFICATION
07/01/20 1540   Provider Notification   Provider Name/Title SALO Rosales   Method of Notification At Bedside   Request Evaluate in Person   Notification Reason Status Update

## 2020-07-01 NOTE — ANESTHESIA PROCEDURE NOTES
Procedure note :  Staff -         Performed By: Anesthesiologist               Assessment/Narrative  .  .  . Comments:  Pre-Procedure  Performed by Isrrael Eng MD  Location: OB.      PreAnesthestic Checklist: patient identified, IV checked, risks and benefits discussed, informed consent obtained, monitors and equipment checked, pre-op evaluation and at physician/surgeon's request.    Timeout   Correct Patient: Yes  Correct Procedure: Epidural catheter placement  Correct Site: Yes   Correct Position: Yes    Procedure Documentation  Procedure:   Epidural catheter block for Labor    Patient currently in labor and she and OBMD request a labor epidural to control her labor pains. Patient was interviewed and examined. Procedure and risks including but not limited to bleeding, infection, nerve injury, paralysis, PDPH, and inadequate block requiring intervention discussed with patient. Questions answered. This epidural is to be placed in anticipation of vaginal delivery.  She consents to the epidural procedure.  Time-out was performed.  I or my partners remain immediately available for management of any issues or complications and will monitor at appropriate intervals.  Procedure: Patient sitting. Betadine prep x 3. Sterile drape applied.  Mask and gloves used.  Lidocaine 1%  local infiltration at L 3-4.  17 G. Tuohy needle at L3-4 by loss of resistance into epidural space.  No CSF, paresthesia or blood. 1.5 % Lidocaine with 1:200,000 Epinephrine 5cc test dose. Epidural catheter inserted w/o resistance to 5 cm in epidural space. Then 0.125% bupivicaine with fentanyl 2 mcg/ml 12 cc with NS 5 cc.  Aspiration negative for blood and CSF.   Negative for neuro change, paresthesia or symptoms of intravascular injection or intrathecal injection.  Infusion orders written and infusion of 0.125% bupivicaine with fentanyl 2 mcg/ml at 10cc per hour started.    Isrrael Eng MD

## 2020-07-01 NOTE — PLAN OF CARE
Data: Patient assessed in the Birthplace for uterine contractions.  Cervical exam 2/90/-1.  Membranes intact.  Contractions every 2-6 minutes.  Action:  Presumed adequate fetal oxygenation documented (see flow record). Discharge instructions reviewed.  Patient instructed to report change in fetal movement, vaginal leaking of fluid or bleeding, abdominal pain, or any concerns related to the pregnancy to her nurse/physician.    Response: Orders to discharge home per Carolyn Subramanian.  Patient verbalized understanding of education and verbalized agreement with plan. Discharged to home at 1920.

## 2020-07-01 NOTE — PROGRESS NOTES
S: Feels tired. Was evaluated in hospital yesterday for possible labor. Sent home with vistaril and MS. Was able to rest a little, but now cramping is worse. Patient is tearful in visit,  Baby active.  Denies uterine cramping, vaginal bleeding or leaking of fluid. No headache, increase in edema, no epigastric pain.   O: Vitals: /62   Wt 111.3 kg (245 lb 6.4 oz)   LMP 09/17/2019   BMI 38.44 kg/m    BMI= Body mass index is 38.44 kg/m .  Exam:  Constitutional: healthy, alert and no distress  Respiratory: respirations even and unlabored  Gastrointestinal: Abdomen soft, non-tender. Fundus measures appropriate for gestational age. Fetal heart tones hear without difficulty and within normal limits  : Normal external genitalia without lesions, cervix 5cm/100%/0 station. Bulging bag of water  Psychiatric: mentation appears normal and affect normal/bright  A:     ICD-10-CM    1. Supervision of low-risk first pregnancy, third trimester  Z34.03      P: Patient transferred to [facility]thing Center via wheelchair. Birthing center and CNM on call      ANTON Gastelum, SALO

## 2020-07-02 LAB — T PALLIDUM AB SER QL: NONREACTIVE

## 2020-07-02 PROCEDURE — 12000000 ZZH R&B MED SURG/OB

## 2020-07-02 PROCEDURE — 25000132 ZZH RX MED GY IP 250 OP 250 PS 637: Performed by: ADVANCED PRACTICE MIDWIFE

## 2020-07-02 RX ADMIN — IBUPROFEN 800 MG: 800 TABLET ORAL at 16:11

## 2020-07-02 RX ADMIN — DOCUSATE SODIUM AND SENNOSIDES 1 TABLET: 8.6; 5 TABLET ORAL at 10:34

## 2020-07-02 RX ADMIN — IBUPROFEN 800 MG: 800 TABLET ORAL at 22:29

## 2020-07-02 RX ADMIN — IBUPROFEN 800 MG: 800 TABLET ORAL at 04:21

## 2020-07-02 RX ADMIN — DOCUSATE SODIUM AND SENNOSIDES 1 TABLET: 8.6; 5 TABLET ORAL at 21:15

## 2020-07-02 RX ADMIN — IBUPROFEN 800 MG: 800 TABLET ORAL at 10:34

## 2020-07-02 RX ADMIN — ACETAMINOPHEN 650 MG: 325 TABLET, FILM COATED ORAL at 18:25

## 2020-07-02 ASSESSMENT — ACTIVITIES OF DAILY LIVING (ADL)
DRESS: 0-->INDEPENDENT
TOILETING: 0-->INDEPENDENT
RETIRED_EATING: 0-->INDEPENDENT
TRANSFERRING: 0-->INDEPENDENT
AMBULATION: 0-->INDEPENDENT
FALL_HISTORY_WITHIN_LAST_SIX_MONTHS: NO
COGNITION: 0 - NO COGNITION ISSUES REPORTED
BATHING: 0-->INDEPENDENT
RETIRED_COMMUNICATION: 0-->UNDERSTANDS/COMMUNICATES WITHOUT DIFFICULTY
SWALLOWING: 0-->SWALLOWS FOODS/LIQUIDS WITHOUT DIFFICULTY

## 2020-07-02 NOTE — LACTATION NOTE
This note was copied from a baby's chart.  LC visit. Infant had fed right before LC entered the room on both breasts.  He did begin to cluster again as LC was providing breastfeeding education so she re-latched him.  He nursed very well.  Excellent feeding pattern observed with frequent swallows and good milk supply with HE.

## 2020-07-02 NOTE — L&D DELIVERY NOTE
OB Vaginal Delivery Note    Minoo William MRN# [id number]   Age: 24 year old YOB: 1995       GA: 39w6d  GP:   Labor Complications: Fetal Intolerance   EBL:   mL  Delivery QBL: 322 mL  Delivery Type: Vaginal, Spontaneous   ROM to Delivery Time: (Delivered) Hours: 2 Minutes: 34   Weight:     1 Minute 5 Minute 10 Minute   Apgar Totals: 8   9           Delivery Note    Labor: Minoo is a 25 YO   At 39w6d who was admitted to [facility] in spontaneous labor. Pregnancy complicated by tobacco use. Labor was augmented with AROM approximately 2 hours prior to delivery and pitocin approximately 1 hour prior to delivery.  Delivery: Minoo was noted to be spontaneously pushing, having a lot of discomfort and pressure. Checked and found to be 9.5cm. Anterior lip of cervix able to be reduced with pushing. FHR in the 60s-70s with pushing, did not resolve with normal resuscitation measures, in-house provider Dr. Ambrose paged to assess for vacuum- see progress note for more details.  : Apgars 8,9. Weight pending, appears AGA  Cord: clamped and cut following cessation of pulsation by FOB  Placenta: delivered S&I via ramos   Perineum: inspected, small first degree laceration noted to be hemostatic, superficial bilateral labial lacerations both hemostatic  Repair: none  Following delivery, pt noted to have brisk bleeding and cervix visible at introitus, bleeding resolved with pressure, In-house provider, Dr. Ambrose in to evaluate for cervical laceration-  no repair necessary.      Disp: family bonding, desires to breastfeed  Mother and infant stable immediately postpartum    ALE Lewis CNM 2020 8:01 PM      Delivery Details:  Minoo William, a 24 year old  female delivered a viable infant with apgars of 8  and 9 . Patient was fully dilated and pushing after   hours   minutes in active labor. Delivery was via vaginal, spontaneous  to a sterile field under epidural   anesthesia. Infant delivered in vertex  left  occiput  anterior  position. Anterior and posterior shoulders delivered without difficulty. The cord was clamped, cut twice and 3 vessels  were noted. Cord blood was obtained in routine fashion with the following disposition: lab .      Cord complications: none   Placenta delivered at 7/1/2020  7:02 PM . Placental disposition was Hospital disposal . Fundal massage performed and fundus found to be firm.     Episiotomy: none    Perineum, vagina, cervix were inspected, and the following lacerations were noted:   Perineal lacerations: 1st     labial laceration: bilateral             Excellent hemostasis was noted. Needle count correct. Infant and patient in delivery room in good and stable condition.        Labor Event Times    Labor onset date:  7/1/20 Onset time:  12:00 PM   Dilation complete date:  7/1/20 Complete time:   6:40 PM   Start pushing date/time:  7/1/2020 1838      Labor Events    Labor Type:  Spontaneous     Rupture date/time: 7/1/20 1622   Rupture type:  Artificial Rupture of Membranes  Fluid color:  Clear  Fluid odor:  Normal     1:1 continuous labor support provided by?:  RN       Delivery/Placenta Date and Time    Delivery Date:  7/1/20 Delivery Time:   6:56 PM   Placenta Date/Time:  7/1/2020  7:02 PM  Oxytocin given at the time of delivery:  after delivery of placenta     Vaginal Counts     Initial count performed by 2 team members:   Two Team Members   Black Blum Cnm       Needles Suture [employer]   Initial counts 2  5    Added to count  1     Final counts 2 1 5    Placed during labor Accounted for at the end of labor   NA NA   NA NA   NA NA    Final count performed by 2 team members:   Two Team Members   Black Blum CNM      Final count correct?:  Yes     Apgars    Living status:  Living   1 Minute 5 Minute 10 Minute 15 Minute 20 Minute   Skin color: 0  1       Heart rate: 2  2       Reflex  irritability: 2  2       Muscle tone: 2  2       Respiratory effort: 2  2       Total: 8  9       Apgars assigned by:  EVONNE ELMORE     Cord    Vessels:  3 Vessels Complications:  None   Cord Blood Disposition:  Lab Gases Sent?:  No      [place] Resuscitation    Output in Delivery Room:  Stool     Skin to Skin and Feeding Plan    Skin to skin initiation date/time: 1841    Skin to skin with:  Mother  Skin to skin end date/time:        Labor Events and Shoulder Dystocia    Fetal Tracing Prior to Delivery:  Category 2  Fetal Tracing Comments:  terminal bradycardia   Shoulder dystocia present?:  Neg     Delivery (Maternal) (Provider to Complete) ([id number])    Episiotomy:  None  Perineal lacerations:  1st Repaired?:  No   Labial laceration:  bilateral Repaired?:  No      Blood Loss  Mother: Minoo William #[id number]   Start of Mother's Information    IO Blood Loss  20 1200 - 20 1933    Delivery QBL (mL) Hospital Encounter 322 mL    Total  322 mL         End of Mother's Information  Mother: Minoo William #[id number]         Delivery - Provider to Complete ([id number])    Delivering clinician:  Leta Ambrose MD  Attempted Delivery Types (Choose all that apply):  Vacuum (Extractor)  Delivery Type (Choose the 1 that will go to the Birth History):  Vaginal, Spontaneous  Verbal Informed Consent Obtained For Vacuum:  Yes    Emergency Resources Available (vacuum):  Charge Nurse/Team  Type (vacuum):  Kiwi Accrued Pulling Time (vacuum):  20   # of Pop-Offs (vacuum):  1 # of Pulls/Contractions (vacuum):  3           Placenta    Delayed Cord Clamping:  Done  Date/Time:  2020  7:02 PM  Removal:  Spontaneous  Comments:  discard per cnm  Disposition:  Hospital disposal     Anesthesia    Method:  Epidural  Cervical dilation at placement:  4-7   Analgesic:   BIRTH HISTORY: ANALGESIC   FENTANYL 1 MCG/ML, ROPIVACAINE 0.2%  ML CNR         Presentation and Position    Presentation:  Vertex  Position:   Left Occiput Anterior           Ynes Lewis, CATARINAM

## 2020-07-02 NOTE — PLAN OF CARE
Patient up adlib and voiding without difficulty. VS and assessment visit WNL. Breastfeeding going well. Patient's mother at bedside and supportive. Pain managed with ibuprofen and tylenol. Bonding well with infant and independent with cares.

## 2020-07-02 NOTE — PROGRESS NOTES
"Abby Dodson CNM Progress Note: Postpartum Day #1    2020  8:56 AM    SUBJECTIVE:  Patient is stable  and is tolerating acitivity well  Baby is rooming in  Complications since 2 hours post delivery: None  Pain is well controlled.  Patient is taking pain medications.  Breastfeeding status:initiated   Elimination:  She is voiding without difficulty.  She has not had a bowel movement  Desired contraception Unsure  Denies heavy bleeding and passing large clots.  Feels good about birth experience.    OBJECTIVE:  /57   Pulse 94   Temp 98.7  F (37.1  C) (Oral)   Resp 20   Ht 1.702 m (5' 7\")   Wt 111.1 kg (245 lb)   LMP 2019   SpO2 98%   Breastfeeding Unknown   BMI 38.37 kg/m      Constitutional: healthy, alert and no distress    Breasts: Currently breastfeeding    Fundus: Uterine fundus is firm, non-tender and  below the level of the umbilicus    Perineum: Perineum is intact and/or well approximated, minimal swelling    Lochia: Lochia is appropriate for the duration of time since delivery.     ASSESSMENT:  PPD #1    Doing well.  Hemoglobin   Date Value Ref Range Status   2020 11.4 (L) 11.7 - 15.7 g/dL Final   ]      PLAN:  Continue routine care  Reviewed breastfeeding  Reviewed postpartum warning signs  Reviewed postpartum blues and postpartum depression warning signs  Plans unsure for contraception postpartum  Anticipated discharge tomorrow        CAMRNO ORLANDO CNM        "

## 2020-07-02 NOTE — PLAN OF CARE
Orientated to room, call bell, rrt, infant safety, ABC, paper work and cares. Up to bathroom, void well, some burning. Tylenol and ice pack for discomfort. Assisted with breastfeeding. Patients mother at bedside as support person.    Patients mobililty level scored using the bedside mobility assistance tool (BMAT). Patient is at a mobility level test number: 4. Mobility equipment used: none required. Required assist of 1 staff members. Further use of BMAT scoring not required.

## 2020-07-02 NOTE — ANESTHESIA POSTPROCEDURE EVALUATION
Patient: Minoo William    * No procedures listed *    Diagnosis:* No pre-op diagnosis entered *  Diagnosis Additional Information: No value filed.    Anesthesia Type:  No value filed.    Note:  Anesthesia Post Evaluation    Patient location during evaluation: Floor  Patient participation: Able to fully participate in evaluation  Level of consciousness: awake and alert  Pain management: adequate  Airway patency: patent  Cardiovascular status: acceptable  Respiratory status: acceptable  Hydration status: acceptable  PONV: none       Comments:   Labor Epidural Post delivery note.      Doing well. VSS Temp normal. Satisfactory respiratory and cardiovascular function. Return of neurologic function. Questions encouraged and answered. Denies positional headache. Minimal side effects easily managed w/ PRN meds. No apparent anesthetic complications. No follow-up required.    I or my partner was immediately available for management of this patient during epidural analgesia infusion.    Alex Subramanian MD          Last vitals:  Vitals:    07/01/20 2100 07/01/20 2115 07/02/20 0213   BP: 108/68 125/71 125/71   Pulse:   94   Resp:   18   Temp:   98.4  F (36.9  C)   SpO2:            Electronically Signed By: Alex Subramanian MD  July 2, 2020  7:50 AM

## 2020-07-02 NOTE — PLAN OF CARE
All discharge instructions were reviewed with patient by writer and any and all questions answered.  Breast pump issued. Awaiting discharge medications; which were reviewed with patient and next times available.

## 2020-07-02 NOTE — PROGRESS NOTES
Late entry due to patient care:    D: [id number] Rn continuously attempting to monitor external heart tones. CNM at bedside throughout. Intermittently able to auscultate heart tones in the 80-90s, up to the 150s-160s at times. Not able to keep fetal monitor on mother throughout this time as she was pushing and moving almost continuously.  A: RN called for extra help and multiple nurses respond. CNM requested back up and vacuum assisted delivery to be performed by MD, which Dr. Ambrose came and performed on patient. NICU team called regarding and arrived to be available for resuscitation regarding prolonged deceleration.   R: vacuum assisted delivery of viable male infant with apgars of 8 and 9. No resuscitation efforts needed beyond drying and stimulation on mother's chest. Infant currently resting in mother's arms.  Bonnie Lama RN on 7/1/2020 at 8:18 PM

## 2020-07-02 NOTE — PLAN OF CARE
Patient meeting expected goals. Is up independent in room, meeting all personal and infant needs. VSS. Pain is being managed with Ibuprofen. Is aware that Tylenol is also available. Also using Tucks and ice packs for perineum discomfort; is effective. Voiding with out difficulty. Patient's mother is supportive and at bedside.  Breastfeeding is going well and is independent with latch. Bonding well with infant.

## 2020-07-02 NOTE — PROGRESS NOTES
Data: Minoo William transferred to [facility] via wheelchair at 2156. Baby transferred via parent's arms.  Action: Receiving unit notified of transfer: Yes. Patient and family notified of room change. Report given to Marivel DOUGLASS at 2156. Belongings sent to receiving unit. Accompanied by Registered Nurse. Oriented patient to surroundings. Call light within reach. ID bands double-checked with receiving RN.  Response: Patient tolerated transfer and is stable.    Patients mobililty level scored using the bedside mobility assistance tool (BMAT). Patient is at a mobility level test number: 4. Mobility equipment used: wheelchair. Required assist of 0 staff members. Further use of BMAT scoring not required.

## 2020-07-02 NOTE — PROGRESS NOTES
[facility]  Vacuum-Assisted Vaginal Delivery Note      I was called in to assist with a VAVD in light of maternal exhaustion and non reassuring fetal heart tones.     Adequate anesthesia was established with epidural. The patient was already in the dorsal lithotomy position.    Fetal position was confirmed to be at +3  station with confirmed rupture of membranes. A disposable omnicup Kiwi vacuum was appiled to the fetal median flexion point. It was centered over the sagittal sutures approximately 3cm anterior to the posterior fontanelle. The area around the vacuum cup was palpated to confirm that there was no maternal tissue within the cup margin. With the start of the contraction, a vacuum seal was established at 40 cmHg, and gentle traction was applied. Advancement of the fetal head was noted with gentle traction applied to the device. A total of 2 pulls were performed, and 1 popoffs occurred. 1 reapplications were performed.    The vacuum was released upon  of the fetal head, and delivery was performed thereafter with the assistance of maternal expulsive efforts with contractions. Anterior shoulder delivered atraumatically by maternal expulsive efforts with the assistance of downward traction. The posterior shoulder was delivered with maternal expulsive efforts and upward traction. The remainder of the fetus delivered spontaneously.     At this point Midwife took over again.     Dr. Aminah Ambrose  [phone]

## 2020-07-03 VITALS
TEMPERATURE: 98 F | HEIGHT: 67 IN | DIASTOLIC BLOOD PRESSURE: 60 MMHG | BODY MASS INDEX: 38.45 KG/M2 | RESPIRATION RATE: 20 BRPM | WEIGHT: 245 LBS | SYSTOLIC BLOOD PRESSURE: 112 MMHG | HEART RATE: 96 BPM | OXYGEN SATURATION: 98 %

## 2020-07-03 LAB
6MAM SERPL-MCNC: NEGATIVE NG/ML
CODEINE UR CFM-MCNC: NEGATIVE NG/ML
MORPHINE UR CFM-MCNC: 698 NG/ML

## 2020-07-03 PROCEDURE — 25000132 ZZH RX MED GY IP 250 OP 250 PS 637: Performed by: ADVANCED PRACTICE MIDWIFE

## 2020-07-03 RX ORDER — IBUPROFEN 600 MG/1
600 TABLET, FILM COATED ORAL EVERY 6 HOURS PRN
COMMUNITY
Start: 2020-07-03

## 2020-07-03 RX ADMIN — IBUPROFEN 800 MG: 800 TABLET ORAL at 11:52

## 2020-07-03 RX ADMIN — DOCUSATE SODIUM AND SENNOSIDES 1 TABLET: 8.6; 5 TABLET ORAL at 09:14

## 2020-07-03 RX ADMIN — ACETAMINOPHEN 650 MG: 325 TABLET, FILM COATED ORAL at 00:22

## 2020-07-03 RX ADMIN — IBUPROFEN 800 MG: 800 TABLET ORAL at 05:51

## 2020-07-03 NOTE — PLAN OF CARE
VS and assessment WNL. Pain managed with tylenol and ibuprofen. Patient up adlib voiding without difficulty. Breastfeeding well without assistance. Bonding well with infant. Support person (mother) in room and supportive. Anticipated discharge today.

## 2020-07-03 NOTE — DISCHARGE INSTRUCTIONS
Postpartum Vaginal Delivery Instructions  Follow up in 2 weeks and at 6 weeks for postpartum visits.  Lactation:  [phone]    Activity       Ask family and friends for help when you need it.    Do not place anything in your vagina for 6 weeks.    You are not restricted on other activities, but take it easy for a few weeks to allow your body to recover from delivery.  You are able to do any activities you feel up to that point.    No driving until you have stopped taking your pain medications (usually two weeks after delivery).     Call your health care provider if you have any of these symptoms:       Increased pain, swelling, redness, or fluid around your stiches from an episiotomy or perineal tear.    A fever above 100.4 F (38 C) with or without chills when placing a thermometer under your tongue.    You soak a sanitary pad with blood within 1 hour, or you see blood clots larger than a golf ball.    Bleeding that lasts more than 6 weeks.    Vaginal discharge that smells bad.    Severe pain, cramping or tenderness in your lower belly area.    A need to urinate more frequently (use the toilet more often), more urgently (use the toilet very quickly), or it burns when you urinate.    Nausea and vomiting.    Redness, swelling or pain around a vein in your leg.    Problems breastfeeding or a red or painful area on your breast.    Chest pain and cough or are gasping for air.    Problems coping with sadness, anxiety, or depression.  If you have any concerns about hurting yourself or the baby, call your provider immediately.     You have questions or concerns after you return home.     Keep your hands clean:  Always wash your hands before touching your perineal area and stitches.  This helps reduce your risk of infection.  If your hands aren't dirty, you may use an alcohol hand-rub to clean your hands. Keep your nails clean and short.      
Alert-The patient is alert, awake and responds to voice. The patient is oriented to time, place, and person. The triage nurse is able to obtain subjective information.

## 2020-07-03 NOTE — PLAN OF CARE
Patient meeting expected goals.  Is up independent in room, meeting all personal and infant needs. VSS. Pain is being managed with Ibuprofen.  Breastfeeding is going well.  Requests to see SW to learn about additional community resources. Paged Francisco J, she will see patient now.  Patient will discharge later today.  Patient is aware to follow up in 2 weeks and at 6 weeks for postpartum visit.

## 2020-07-03 NOTE — CONSULTS
"[facility]  MATERNAL CHILD HEALTH   INITIAL PSYCHOSOCIAL ASSESSMENT     DATA:     Reason for Social Work Consult: Primip, single mom with History of depression.    Presenting Information: PARVIZ met with Minoo and her mother Elisabeth. Minoo currently resides with her parents in [place] until her rental home is ready in about 2 months in [facility]. Minoo is no longer partnered with [employer], she reports he is involved and supportive and denies abuse.  Minoo's first child, a son Partha, was  born on 20 and she is prepared for him at home and is on WIC. She would like a referral to PH[facility].       Social Support: Family and friends.    Employment: Minoo has been on Furlough through work which will be extended 6-8 weeks. She then plans to go back to work and her friend who has a  license will watch baby.    Insurance: [employer] [place]    Pediatrician: [facility]    Source of Financial Support: Employment     Mental Health History: Significant for short inpatient stay due to suicidal ideation in 2017. She is currently not on medications. She scored 4 on EPDS with no thoughts of harm.     History of Postpartum Mood Disorders: N/A    Chemical Health History: N/A      INTERVENTION:       PARVIZ completed chart review and collaborated with the multidisciplinary team.     Psychosocial Assessment     Introduction to Maternal Child Health  role and scope of practice     Reviewed Hospital and Community Resources     PARVIZ made PHN referral per pt's permission.    PARVIZ discussed smoking outside etc. And gave \"does your baby smoke\" brochure as pt is a smoker.    Assessed Chemical Health History and Current Symptoms     Assessed Mental Health History and Current Symptoms     Identified stressors, barriers and family concerns     Provided support and active empathetic listening and validation.     Provided psychoeducation on  mood and anxiety disorders, assessed for any current " symptoms or history    Provided brochure Depression and Anxiety During and after Pregnancy.     ASSESSMENT:     Coping: Well    Affect: appropriate with good eye contact and frequent smiles.    Mood:  appropriate, stable and calm.    Motivation/Ability to Access Services: Independent in accessing services.    Assessment of Support System: stable and involved    Level of engagement with SW:Engaged and appropriate. Able to seek out SW when needs arise.     Family and parent/infant interactions: MOB and Family are supportive of each other and are bonding well with baby.    Assessment of parental risk for PMAD: Possibly higher than average risk given history of depressive episode 3 years ago. Minoo and her mother are aware of s/s and able and willing to seek assistance as needed.    Strengths:  caring family, willingness to accept help    Identified Barriers: None at this time     PLAN:     SW will continue to follow throughout pt's Maternal-Child Health Journey as needs arise. SW will continue to collaborate with the multidisciplinary team.    Francisco J Emmanuel [facility] Case Management  Inpatient   [facility]   [phone]

## 2020-07-03 NOTE — PROGRESS NOTES
All discharge instructions were reviewed with patient by writer and all questions answered.  Patient left unit with infant and all belongings at 1210.

## 2020-07-03 NOTE — DISCHARGE SUMMARY
"Vaginal Delivery Postpartum Day 2    Patient Name:  Minoo William  :      1995  MRN:      [id number]      Subjective:  Minoo William is feeling well and ready for discharge home.  States she is breastfeeding well and voiding without difficulty.  States vaginal bleeding has decreased.  Denies passing any large clots.  Pain is well controlled with ibuprofen.  Undecided about postpartum birth control, will discuss with CNM further at 2 week visit. Denies any concerns today and will have help at home.     Objective:  /60   Pulse 96   Temp 98  F (36.7  C) (Oral)   Resp 20   Ht 1.702 m (5' 7\")   Wt 111.1 kg (245 lb)   LMP 2019   SpO2 98%   Breastfeeding Unknown   BMI 38.37 kg/m      Breasts: soft, lactating   Abdomen:soft, non-tender, fundus firm @ 2 below U  Perineum: healing, no edema, lochia small rubra   Extremities: no edema         Immunization History   Administered Date(s) Administered     Influenza Vaccine IM > 6 months Valent IIV4 2020     TDAP Vaccine (Adacel) 2020       Assessment:    -Stable PPD #2.   -Normal involution  -Breastfeeding      Plan:   -New mom information reviewed and all questions answered.  -Discharge home today  -Follow up in CNM clinic in 2 and 6 weeks  -Call with questions/concerns         Provider:  ANTON Ambrose CNM, LETI    Date:  7/3/2020  Time:  9:48 AM    Patient Name:  Minoo William  :  1995  MRN:  [id number]  "

## 2020-07-03 NOTE — PLAN OF CARE
Vitals stable. Motrin and tylenol for discomfort. Nipples tender. Nursing baby well. Discussed social work consult, patient agreeable in wanting to speak to .

## 2020-07-15 ENCOUNTER — PRENATAL OFFICE VISIT (OUTPATIENT)
Dept: OBGYN | Facility: CLINIC | Age: 25
End: 2020-07-15
Payer: COMMERCIAL

## 2020-07-15 VITALS — WEIGHT: 228 LBS | DIASTOLIC BLOOD PRESSURE: 68 MMHG | SYSTOLIC BLOOD PRESSURE: 120 MMHG | BODY MASS INDEX: 35.71 KG/M2

## 2020-07-15 PROCEDURE — 99207 ZZC POST PARTUM EXAM: CPT | Performed by: ADVANCED PRACTICE MIDWIFE

## 2020-07-15 ASSESSMENT — PATIENT HEALTH QUESTIONNAIRE - PHQ9: SUM OF ALL RESPONSES TO PHQ QUESTIONS 1-9: 0

## 2020-07-15 NOTE — NURSING NOTE
"Chief Complaint   Patient presents with     Postpartum Care     2 week PP       Initial /68 (BP Location: Left arm, Cuff Size: Adult Regular)   Wt 103.4 kg (228 lb)   LMP 2019   Breastfeeding Yes   BMI 35.71 kg/m   Estimated body mass index is 35.71 kg/m  as calculated from the following:    Height as of 20: 1.702 m (5' 7\").    Weight as of this encounter: 103.4 kg (228 lb).  BP completed using cuff size: regular    Questioned patient about current smoking habits.  Pt. currently smokes.  Advised about smoking cessation.          Rick Morton MA             "

## 2020-07-15 NOTE — PROGRESS NOTES
"Midwife Postpartum 2 Week Visit    Minoo William is a 25 year old here for a 2 week postpartum checkup.     Delivery date was 2020. She had a  of a viable boy, \"Partha\", weight 7 pounds 3.3 oz., with fetal distress complications      Since delivery, she has been breast feeding. Offering bottles of pumped breast milk prn. States baby has a preference for left side and will sometimes pump right side as needed. Feels she has been able to maintain an adequate milk supply. She has not had any signs of infection. Her lochia is now spotting. She is voiding without difficulty.  Bowel movements are every other day. Taking stool softeners as needed. She has not had other complications.      Contraception was discussed and patient desires Nexplanon.    She  has not had intercourse since delivery.   She complains of no perineal discomfort.     Mood is Stable  Patient screened for postpartum depression.   EPDS = 0  Last PHQ-9 score on record = No flowsheet data found.  Last GAD7 score on record = No flowsheet data found.  Alcohol Score = 0    ROS:  CONSTITUTIONAL: NEGATIVE for fever, chills, change in weight  INTEGUMENTARU/SKIN: NEGATIVE for worrisome rashes, moles or lesions  EYES: NEGATIVE for vision changes or irritation  ENT: NEGATIVE for ear, mouth and throat problems  RESP: NEGATIVE for significant cough or SOB  BREAST: NEGATIVE for masses, tenderness or discharge  CV: NEGATIVE for chest pain, palpitations or peripheral edema  GI: NEGATIVE for nausea, abdominal pain, heartburn, or change in bowel habits  : NEGATIVE for unusual urinary or vaginal symptoms.  MUSCULOSKELETAL: NEGATIVE for significant arthralgias or myalgia  NEURO: NEGATIVE for weakness, dizziness or paresthesias  PSYCHIATRIC: NEGATIVE for changes in mood or affect      Current Outpatient Medications:      ibuprofen (ADVIL/MOTRIN) 600 MG tablet, Take 1 tablet (600 mg) by mouth every 6 hours as needed (cramping), Disp: , Rfl:      Prenatal " Vit-Fe Fumarate-FA (PRENATAL MULTIVITAMIN W/IRON) 27-0.8 MG tablet, Take 1 tablet by mouth daily, Disp: 90 tablet, Rfl: 3     order for DME, Equipment being ordered: Abdominal binder (Patient not taking: Reported on 7/15/2020), Disp: 1 Device, Rfl: 0.     OB History    Para Term  AB Living   1 1 1 0 0 1   SAB TAB Ectopic Multiple Live Births   0 0 0 0 1      # Outcome Date GA Lbr Dalton/2nd Weight Sex Delivery Anes PTL Lv   1 Term 20 39w6d 06:40 / 00:15 3.27 kg (7 lb 3.3 oz) M Vag-Spont EPI N TERRY      Complications: Fetal Intolerance      Name: CRISTIANE QUINONES      Apgar1: 8  Apgar5: 9     Last pap: 3/14/14, NIL  Hgb in hospital was not done. 11.4 at 28 weeks. QBL with delivery = 322 ml    EXAM:  /68 (BP Location: Left arm, Cuff Size: Adult Regular)   Wt 103.4 kg (228 lb)   LMP 2019   Breastfeeding Yes   BMI 35.71 kg/m    BMI: Body mass index is 35.71 kg/m .  Exam:  Constitutional: Healthy, alert and no distress  Respiratory: Breathing even, unlabored. Good diaphragmatic excursion.   Psychiatric: Mentation appears normal and affect normal/bright    ASSESSMENT:   Normal postpartum exam after .    ICD-10-CM    1. Routine postpartum follow-up  Z39.2        PLAN:  No results found for any visits on 07/15/20.    Return for 6 week postpartum visit for Pap and Nexplanon insertion.    Teaching: exercise, birth control and mental health  Family Planning: Nexplanon.  Encourage Kegels and abdominal exercise.  Continue multivitamin/prenatal supplement, especially while breastfeeding.  Postpartum Hgb was not done today.    Antonia Redmond, DNP, APRN, CNM

## 2020-08-12 ENCOUNTER — PRENATAL OFFICE VISIT (OUTPATIENT)
Dept: OBGYN | Facility: CLINIC | Age: 25
End: 2020-08-12
Payer: COMMERCIAL

## 2020-08-12 VITALS — SYSTOLIC BLOOD PRESSURE: 118 MMHG | DIASTOLIC BLOOD PRESSURE: 70 MMHG | BODY MASS INDEX: 36.49 KG/M2 | WEIGHT: 233 LBS

## 2020-08-12 DIAGNOSIS — Z30.017 INSERTION OF IMPLANTABLE SUBDERMAL CONTRACEPTIVE: ICD-10-CM

## 2020-08-12 DIAGNOSIS — Z12.4 PAP SMEAR FOR CERVICAL CANCER SCREENING: ICD-10-CM

## 2020-08-12 PROCEDURE — 11981 INSERTION DRUG DLVR IMPLANT: CPT | Performed by: ADVANCED PRACTICE MIDWIFE

## 2020-08-12 PROCEDURE — 99207 ZZC POST PARTUM EXAM: CPT | Performed by: ADVANCED PRACTICE MIDWIFE

## 2020-08-12 PROCEDURE — G0145 SCR C/V CYTO,THINLAYER,RESCR: HCPCS | Performed by: ADVANCED PRACTICE MIDWIFE

## 2020-08-12 NOTE — PROGRESS NOTES
Midwife Postpartum 6 Week Visit    Minoo William is a 25 year old here for a postpartum checkup.     Delivery date was 20. She had a  of a viable boy, weight 7 pounds 3.3 oz., with no complications      Since delivery, she has not been breast feeding.  She has not had any signs of infection, her lochia stopped after 4 weeks.  She has not had other complications.      She is voiding and having bowel movements without difficulty.       Contraception was discussed and patient desires Nexplanon.           Counseling:    Nexplanon is well tolerated and has a low early-removal rate.    Insertion site complications, such as prolonged pain or infection, are rare. Removal is occasionally difficult, and rarely requires a surgical procedure in the operating room.    Menstrual changes are common with Nexplanon. Bleeding may become more or less frequent or heavy, or absent. The bleeding pattern after the first three months is predictive of future bleeding, but the pattern may change at any time. Average bleeding is 18 days over 3 months. Over 50% of women experience rare over absent bleeding over the two year period, while 25% experience frequent or prolonged bleeding.    In clinical studies, users gained 3.7 pounds over two years. It is unknown what portion of this weight gain is related to Nexplanon    Women with a history of depressed mood may have worsening on Nexplanon, and may need to have the device removed.    Return to baseline ovulation patterns is seen 7-14 days after removal of Nexplanon.    Rarely, headaches and acne have also led to device removal.    Nexplanon may be less effective in women who weight more than 130% of their ideal body weight.    Nexplanon does not protect against HIV or STDs.    Use a back-up method of birth control for the first 7 days after insertion of Nexplanon.    She  has not had intercourse since delivery.   She complains of No  perineal discomfort.     Mood is  Stable  Patient screened for postpartum depression.   Depression Rating was:   Last PHQ-9 score on record =   PHQ-9 SCORE 7/15/2020   PHQ-9 Total Score 0     Last GAD7 score on record = No flowsheet data found.  Alcohol Score = 0    ROS:  CONSTITUTIONAL: NEGATIVE for fever, chills, change in weight  INTEGUMENTARU/SKIN: NEGATIVE for worrisome rashes, moles or lesions  EYES: NEGATIVE for vision changes or irritation  ENT: NEGATIVE for ear, mouth and throat problems  RESP: NEGATIVE for significant cough or SOB  BREAST: NEGATIVE for masses, tenderness or discharge  CV: NEGATIVE for chest pain, palpitations or peripheral edema  GI: NEGATIVE for nausea, abdominal pain, heartburn, or change in bowel habits  : NEGATIVE for unusual urinary or vaginal symptoms. Periods are regular.  MUSCULOSKELETAL: NEGATIVE for significant arthralgias or myalgia  NEURO: NEGATIVE for weakness, dizziness or paresthesias  ENDOCRINE: NEGATIVE for temperature intolerance, skin/hair changes  HEME/ALLERGY/IMMUNE: NEGATIVE for bleeding problems  PSYCHIATRIC: NEGATIVE for changes in mood or affect      Current Outpatient Medications:      ibuprofen (ADVIL/MOTRIN) 600 MG tablet, Take 1 tablet (600 mg) by mouth every 6 hours as needed (cramping), Disp: , Rfl:      Prenatal Vit-Fe Fumarate-FA (PRENATAL MULTIVITAMIN W/IRON) 27-0.8 MG tablet, Take 1 tablet by mouth daily, Disp: 90 tablet, Rfl: 3     order for DME, Equipment being ordered: Abdominal binder (Patient not taking: Reported on 7/15/2020), Disp: 1 Device, Rfl: 0.   OB History    Para Term  AB Living   1 1 1 0 0 1   SAB TAB Ectopic Multiple Live Births   0 0 0 0 1      # Outcome Date GA Lbr Dalton/2nd Weight Sex Delivery Anes PTL Lv   1 Term 20 39w6d 06:40 / 00:15 3.27 kg (7 lb 3.3 oz) M Vag-Spont EPI N TERRY      Complications: Fetal Intolerance      Name: MARIVEL QUINONES-MARK      Apgar1: 8  Apgar5: 9     Last pap:  No results found for: PAP  Hgb in hospital was WNL    EXAM:  LMP  09/17/2019   BMI: There is no height or weight on file to calculate BMI.  Exam:  Constitutional: healthy, alert and no distress  Head: Normocephalic. No masses, lesions, tenderness or abnormalities  Neck: Neck supple. No adenopathy. Thyroid symmetric, normal size,, Carotids without bruits.  ENT: ENT exam normal, no neck nodes or sinus tenderness  Cardiovascular: negative, PMI normal. No lifts, heaves, or thrills. RRR. No murmurs, clicks gallops or rub  Respiratory: negative, Percussion normal. Good diaphragmatic excursion. Lungs clear  Breasts:  Deferred as patient was lactating a short time ago  Gastrointestinal: Abdomen soft, non-tender. BS normal. No masses, organomegaly    Musculoskeletal: extremities normal- no gross deformities noted, gait normal and normal muscle tone  Skin: no suspicious lesions or rashes  Neurologic: Gait normal. Reflexes normal and symmetric. Sensation grossly WNL.  Psychiatric: mentation appears normal and affect normal/bright  Hematologic/Lymphatic/Immunologic: Normal cervical lymph nodes  PELVIC EXAM:  Vulva: No lesions, well healed, BUS WNL, no tenderness  Vagina: Moist, pink, discharge normal  well rugated, no lesions  Cervix:smooth, pink, no visible lesions, pap obtained  Uterus: Involuted to normal size, non-tender, no masses palpated  Ovaries: No masses palpated  Pelvic tone: WNL  Rectal exam: deferred    The entire insertion procedure was reviewed with the patient, including care after placement.    Patient's last menstrual period was 09/17/2019. Last sexual activity: prior to delivery. No allergy to betadine or shellfish.   No results found for: HCG    PROCEDURE NOTE: -- Nexplanon Insertion    Reason for Insertion: contraception    Patient was placed supine with left arm exposed.  Rosalino was made 8-10 cm above medial epicondyle and a guiding rosalino 4 cm above the first.  Arm was prepped with Betadine. Insertion point was anesthetized with 5 mL 1% lidocaine. After stretching the skin  with thumb and index finger around the insertion site, skin punctured with the tip of the needle inserted at 30 degrees and then lowered to horizontal position. The needle was then advanced to its full length. Applicator was then stabilized and slider was unlocked. Slider was pulled back until it stopped and then removed.    Correct placement of the implant was confirmed by palpation in the patient's arm and visualizing the purple top of the obturator.   Bandage and pressure dressing applied to insertion site.    Lot # [id number]  Exp: 22    EBL: minimal    Complications: none          ASSESSMENT:   Normal postpartum exam after .      ICD-10-CM    1. Routine postpartum follow-up  Z39.2    2. Pap smear for cervical cancer screening  Z12.4 Pap imaged thin layer screen reflex to HPV if ASCUS - recommend age 25 - 29   3. Insertion of implantable subdermal contraceptive  Z30.017 etonogestrel (NEXPLANON) 68 MG IMPL     etonogestrel (NEXPLANON) subdermal implant 68 mg     INSERTION NON-BIODEGRADABLE DRUG DELIVERY IMPLANT         PLAN:      Return as needed or at time of next expected pap, pelvic, or breast exam.  Teaching: exercise and birth control  Family Planning:Nexplanon  Encourage Kegels and abdominal exercise.  Continue a multivitamin/prenatal supplement, especially if breastfeeding.  Pap smear was obtained today.  Postpartum Hgb was not done today.    ANTON Gastelum, CNM

## 2020-08-12 NOTE — PATIENT INSTRUCTIONS
What Nexplanon Users May Expect    For appropiate patients, Nexplanon is well tolerated and has a low early-removal rate.    Insertion site complications, such as prolonged pain or infection, are rare. Removal is occasionally difficult, and rarely requires a surgical procedure in the operating room.    Menstrual changes are common with Nexplanon. Bleeding may become more or less frequent or heavy, or absent. The bleeding pattern after the first three months is predictive of future bleeding, but the pattern may change at any time. Average bleeding is 18 days over 3 months. Over 50% of women experience rare over absent bleeding over the two year period, while 25% experience frequent or prolonged bleeding.    In clinical studies, users gained 3.7 pounds over two years. It is unknown what portion of this weight gain is related to Nexplanon    Women with a history of depressed mood may have worsening on Nexplanon, and may need to have the device removed.    Return to baseline ovulation patterns is seen 7-14 days after removal of Nexplanon.    Rarely, headaches and acne have also let to device removal.    Nexplanon may be less effective in women weight more than 130% of their ideal body weight.    Nexplanon does not protect against HIV or STDs.    Please call [facility] at [phone] if you have questions or concerns.    For more complete information:  [email or web address]

## 2020-08-12 NOTE — NURSING NOTE
"Chief Complaint   Patient presents with     Postpartum Care           Contraception     nexplanon insert       Initial /70 (BP Location: Right arm, Cuff Size: Adult Large)   Wt 105.7 kg (233 lb)   LMP 2019   Breastfeeding No   BMI 36.49 kg/m   Estimated body mass index is 36.49 kg/m  as calculated from the following:    Height as of 20: 1.702 m (5' 7\").    Weight as of this encounter: 105.7 kg (233 lb).  BP completed using cuff size: large    Questioned patient about current smoking habits.  Pt. currently smokes.  Advised about smoking cessation.          The following HM Due: pap smear    Stacy Harper CMA    "

## 2020-08-17 LAB
COPATH REPORT: NORMAL
PAP: NORMAL

## 2020-12-27 ENCOUNTER — HEALTH MAINTENANCE LETTER (OUTPATIENT)
Age: 25
End: 2020-12-27

## 2021-10-03 ENCOUNTER — HEALTH MAINTENANCE LETTER (OUTPATIENT)
Age: 26
End: 2021-10-03

## 2022-09-11 ENCOUNTER — HEALTH MAINTENANCE LETTER (OUTPATIENT)
Age: 27
End: 2022-09-11

## 2023-01-22 ENCOUNTER — HEALTH MAINTENANCE LETTER (OUTPATIENT)
Age: 28
End: 2023-01-22

## 2024-02-24 ENCOUNTER — HEALTH MAINTENANCE LETTER (OUTPATIENT)
Age: 29
End: 2024-02-24